# Patient Record
Sex: MALE | Race: WHITE | NOT HISPANIC OR LATINO | Employment: OTHER | URBAN - METROPOLITAN AREA
[De-identification: names, ages, dates, MRNs, and addresses within clinical notes are randomized per-mention and may not be internally consistent; named-entity substitution may affect disease eponyms.]

---

## 2017-01-29 ENCOUNTER — HOSPITAL ENCOUNTER (OUTPATIENT)
Dept: RADIOLOGY | Facility: MEDICAL CENTER | Age: 72
End: 2017-01-29

## 2017-01-29 ENCOUNTER — APPOINTMENT (OUTPATIENT)
Dept: RADIOLOGY | Facility: MEDICAL CENTER | Age: 72
DRG: 149 | End: 2017-01-29
Attending: INTERNAL MEDICINE
Payer: MEDICARE

## 2017-01-29 ENCOUNTER — APPOINTMENT (OUTPATIENT)
Dept: RADIOLOGY | Facility: MEDICAL CENTER | Age: 72
DRG: 149 | End: 2017-01-29
Attending: EMERGENCY MEDICINE
Payer: MEDICARE

## 2017-01-29 ENCOUNTER — HOSPITAL ENCOUNTER (INPATIENT)
Facility: MEDICAL CENTER | Age: 72
LOS: 3 days | DRG: 149 | End: 2017-02-01
Attending: EMERGENCY MEDICINE | Admitting: INTERNAL MEDICINE
Payer: MEDICARE

## 2017-01-29 ENCOUNTER — RESOLUTE PROFESSIONAL BILLING HOSPITAL PROF FEE (OUTPATIENT)
Dept: HOSPITALIST | Facility: MEDICAL CENTER | Age: 72
End: 2017-01-29
Payer: MEDICARE

## 2017-01-29 DIAGNOSIS — R27.0 ATAXIA: ICD-10-CM

## 2017-01-29 DIAGNOSIS — I10 ESSENTIAL HYPERTENSION: ICD-10-CM

## 2017-01-29 DIAGNOSIS — E03.9 HYPOTHYROIDISM, UNSPECIFIED TYPE: ICD-10-CM

## 2017-01-29 DIAGNOSIS — E11.9 TYPE 2 DIABETES MELLITUS WITHOUT COMPLICATION, UNSPECIFIED LONG TERM INSULIN USE STATUS: ICD-10-CM

## 2017-01-29 PROBLEM — I63.9 STROKE (HCC): Status: ACTIVE | Noted: 2017-01-29

## 2017-01-29 PROBLEM — R29.90 STROKE-LIKE SYMPTOMS: Status: ACTIVE | Noted: 2017-01-29

## 2017-01-29 LAB
ALBUMIN SERPL BCP-MCNC: 4 G/DL (ref 3.2–4.9)
ALBUMIN/GLOB SERPL: 1.5 G/DL
ALP SERPL-CCNC: 40 U/L (ref 30–99)
ALT SERPL-CCNC: 17 U/L (ref 2–50)
ANION GAP SERPL CALC-SCNC: 8 MMOL/L (ref 0–11.9)
APTT PPP: 27.7 SEC (ref 24.7–36)
AST SERPL-CCNC: 15 U/L (ref 12–45)
BASOPHILS # BLD AUTO: 1.1 % (ref 0–1.8)
BASOPHILS # BLD: 0.05 K/UL (ref 0–0.12)
BILIRUB SERPL-MCNC: 0.6 MG/DL (ref 0.1–1.5)
BUN SERPL-MCNC: 20 MG/DL (ref 8–22)
CALCIUM SERPL-MCNC: 8.8 MG/DL (ref 8.5–10.5)
CHLORIDE SERPL-SCNC: 107 MMOL/L (ref 96–112)
CO2 SERPL-SCNC: 25 MMOL/L (ref 20–33)
CREAT SERPL-MCNC: 1.4 MG/DL (ref 0.5–1.4)
EOSINOPHIL # BLD AUTO: 0.1 K/UL (ref 0–0.51)
EOSINOPHIL NFR BLD: 2.2 % (ref 0–6.9)
ERYTHROCYTE [DISTWIDTH] IN BLOOD BY AUTOMATED COUNT: 45.4 FL (ref 35.9–50)
EST. AVERAGE GLUCOSE BLD GHB EST-MCNC: 134 MG/DL
GFR SERPL CREATININE-BSD FRML MDRD: 50 ML/MIN/1.73 M 2
GLOBULIN SER CALC-MCNC: 2.6 G/DL (ref 1.9–3.5)
GLUCOSE BLD-MCNC: 106 MG/DL (ref 65–99)
GLUCOSE BLD-MCNC: 167 MG/DL (ref 65–99)
GLUCOSE BLD-MCNC: 97 MG/DL (ref 65–99)
GLUCOSE SERPL-MCNC: 141 MG/DL (ref 65–99)
HBA1C MFR BLD: 6.3 % (ref 0–5.6)
HCT VFR BLD AUTO: 51.7 % (ref 42–52)
HGB BLD-MCNC: 17.3 G/DL (ref 14–18)
IMM GRANULOCYTES # BLD AUTO: 0.01 K/UL (ref 0–0.11)
IMM GRANULOCYTES NFR BLD AUTO: 0.2 % (ref 0–0.9)
INR PPP: 0.93 (ref 0.87–1.13)
LYMPHOCYTES # BLD AUTO: 0.92 K/UL (ref 1–4.8)
LYMPHOCYTES NFR BLD: 20.2 % (ref 22–41)
MCH RBC QN AUTO: 29.8 PG (ref 27–33)
MCHC RBC AUTO-ENTMCNC: 33.5 G/DL (ref 33.7–35.3)
MCV RBC AUTO: 89.1 FL (ref 81.4–97.8)
MONOCYTES # BLD AUTO: 0.29 K/UL (ref 0–0.85)
MONOCYTES NFR BLD AUTO: 6.4 % (ref 0–13.4)
NEUTROPHILS # BLD AUTO: 3.18 K/UL (ref 1.82–7.42)
NEUTROPHILS NFR BLD: 69.9 % (ref 44–72)
NRBC # BLD AUTO: 0 K/UL
NRBC BLD AUTO-RTO: 0 /100 WBC
PLATELET # BLD AUTO: 125 K/UL (ref 164–446)
PMV BLD AUTO: 10.1 FL (ref 9–12.9)
POTASSIUM SERPL-SCNC: 4.3 MMOL/L (ref 3.6–5.5)
PROT SERPL-MCNC: 6.6 G/DL (ref 6–8.2)
PROTHROMBIN TIME: 12.7 SEC (ref 12–14.6)
RBC # BLD AUTO: 5.8 M/UL (ref 4.7–6.1)
SODIUM SERPL-SCNC: 140 MMOL/L (ref 135–145)
TROPONIN I SERPL-MCNC: <0.01 NG/ML (ref 0–0.04)
WBC # BLD AUTO: 4.6 K/UL (ref 4.8–10.8)

## 2017-01-29 PROCEDURE — 99223 1ST HOSP IP/OBS HIGH 75: CPT | Mod: AI | Performed by: INTERNAL MEDICINE

## 2017-01-29 PROCEDURE — 70547 MR ANGIOGRAPHY NECK W/O DYE: CPT

## 2017-01-29 PROCEDURE — 85025 COMPLETE CBC W/AUTO DIFF WBC: CPT

## 2017-01-29 PROCEDURE — 770020 HCHG ROOM/CARE - TELE (206)

## 2017-01-29 PROCEDURE — 83036 HEMOGLOBIN GLYCOSYLATED A1C: CPT

## 2017-01-29 PROCEDURE — 700102 HCHG RX REV CODE 250 W/ 637 OVERRIDE(OP): Performed by: INTERNAL MEDICINE

## 2017-01-29 PROCEDURE — 85730 THROMBOPLASTIN TIME PARTIAL: CPT

## 2017-01-29 PROCEDURE — 71010 DX-CHEST-PORTABLE (1 VIEW): CPT

## 2017-01-29 PROCEDURE — 94760 N-INVAS EAR/PLS OXIMETRY 1: CPT

## 2017-01-29 PROCEDURE — 80053 COMPREHEN METABOLIC PANEL: CPT

## 2017-01-29 PROCEDURE — 70544 MR ANGIOGRAPHY HEAD W/O DYE: CPT

## 2017-01-29 PROCEDURE — 99285 EMERGENCY DEPT VISIT HI MDM: CPT

## 2017-01-29 PROCEDURE — 93005 ELECTROCARDIOGRAM TRACING: CPT | Performed by: EMERGENCY MEDICINE

## 2017-01-29 PROCEDURE — 82962 GLUCOSE BLOOD TEST: CPT | Mod: 91

## 2017-01-29 PROCEDURE — 84484 ASSAY OF TROPONIN QUANT: CPT

## 2017-01-29 PROCEDURE — A9270 NON-COVERED ITEM OR SERVICE: HCPCS | Performed by: INTERNAL MEDICINE

## 2017-01-29 PROCEDURE — 700105 HCHG RX REV CODE 258: Performed by: EMERGENCY MEDICINE

## 2017-01-29 PROCEDURE — 85610 PROTHROMBIN TIME: CPT

## 2017-01-29 PROCEDURE — 36415 COLL VENOUS BLD VENIPUNCTURE: CPT

## 2017-01-29 PROCEDURE — 96360 HYDRATION IV INFUSION INIT: CPT

## 2017-01-29 PROCEDURE — 96361 HYDRATE IV INFUSION ADD-ON: CPT

## 2017-01-29 PROCEDURE — 70551 MRI BRAIN STEM W/O DYE: CPT

## 2017-01-29 PROCEDURE — 700105 HCHG RX REV CODE 258: Performed by: INTERNAL MEDICINE

## 2017-01-29 RX ORDER — BISACODYL 10 MG
10 SUPPOSITORY, RECTAL RECTAL
Status: DISCONTINUED | OUTPATIENT
Start: 2017-01-30 | End: 2017-02-01 | Stop reason: HOSPADM

## 2017-01-29 RX ORDER — ASPIRIN 300 MG/1
300 SUPPOSITORY RECTAL DAILY
Status: DISCONTINUED | OUTPATIENT
Start: 2017-01-29 | End: 2017-02-01 | Stop reason: HOSPADM

## 2017-01-29 RX ORDER — DEXTROSE MONOHYDRATE 25 G/50ML
25 INJECTION, SOLUTION INTRAVENOUS
Status: DISCONTINUED | OUTPATIENT
Start: 2017-01-29 | End: 2017-02-01 | Stop reason: HOSPADM

## 2017-01-29 RX ORDER — LEVOTHYROXINE SODIUM 0.05 MG/1
50 TABLET ORAL
COMMUNITY
End: 2017-08-09

## 2017-01-29 RX ORDER — ASPIRIN 81 MG/1
324 TABLET, CHEWABLE ORAL DAILY
Status: DISCONTINUED | OUTPATIENT
Start: 2017-01-29 | End: 2017-02-01 | Stop reason: HOSPADM

## 2017-01-29 RX ORDER — ONDANSETRON 4 MG/1
4 TABLET, ORALLY DISINTEGRATING ORAL EVERY 4 HOURS PRN
Status: DISCONTINUED | OUTPATIENT
Start: 2017-01-29 | End: 2017-02-01 | Stop reason: HOSPADM

## 2017-01-29 RX ORDER — AMOXICILLIN 250 MG
1 CAPSULE ORAL
Status: DISCONTINUED | OUTPATIENT
Start: 2017-01-30 | End: 2017-02-01 | Stop reason: HOSPADM

## 2017-01-29 RX ORDER — HYDRALAZINE HYDROCHLORIDE 20 MG/ML
10 INJECTION INTRAMUSCULAR; INTRAVENOUS
Status: DISCONTINUED | OUTPATIENT
Start: 2017-01-29 | End: 2017-02-01 | Stop reason: HOSPADM

## 2017-01-29 RX ORDER — LACTULOSE 20 G/30ML
30 SOLUTION ORAL
Status: DISCONTINUED | OUTPATIENT
Start: 2017-01-30 | End: 2017-02-01 | Stop reason: HOSPADM

## 2017-01-29 RX ORDER — DOCUSATE SODIUM 100 MG/1
100 CAPSULE, LIQUID FILLED ORAL EVERY MORNING
Status: DISCONTINUED | OUTPATIENT
Start: 2017-01-30 | End: 2017-02-01 | Stop reason: HOSPADM

## 2017-01-29 RX ORDER — SODIUM CHLORIDE 9 MG/ML
INJECTION, SOLUTION INTRAVENOUS CONTINUOUS
Status: DISCONTINUED | OUTPATIENT
Start: 2017-01-29 | End: 2017-01-29

## 2017-01-29 RX ORDER — ASPIRIN 325 MG
325 TABLET ORAL DAILY
Status: DISCONTINUED | OUTPATIENT
Start: 2017-01-29 | End: 2017-02-01 | Stop reason: HOSPADM

## 2017-01-29 RX ORDER — LEVOTHYROXINE SODIUM 0.05 MG/1
50 TABLET ORAL
Status: DISCONTINUED | OUTPATIENT
Start: 2017-01-29 | End: 2017-02-01 | Stop reason: HOSPADM

## 2017-01-29 RX ORDER — ENEMA 19; 7 G/133ML; G/133ML
1 ENEMA RECTAL
Status: DISCONTINUED | OUTPATIENT
Start: 2017-01-30 | End: 2017-02-01 | Stop reason: HOSPADM

## 2017-01-29 RX ORDER — ZOLPIDEM TARTRATE 5 MG/1
10 TABLET ORAL
Status: DISCONTINUED | OUTPATIENT
Start: 2017-01-29 | End: 2017-02-01 | Stop reason: HOSPADM

## 2017-01-29 RX ORDER — ALPRAZOLAM 0.5 MG/1
1 TABLET ORAL
Status: DISCONTINUED | OUTPATIENT
Start: 2017-01-29 | End: 2017-02-01 | Stop reason: HOSPADM

## 2017-01-29 RX ORDER — ATENOLOL 25 MG/1
25 TABLET ORAL DAILY
COMMUNITY

## 2017-01-29 RX ORDER — AMOXICILLIN 250 MG
1 CAPSULE ORAL NIGHTLY
Status: DISCONTINUED | OUTPATIENT
Start: 2017-01-30 | End: 2017-02-01 | Stop reason: HOSPADM

## 2017-01-29 RX ORDER — AMLODIPINE BESYLATE 10 MG/1
10 TABLET ORAL DAILY
COMMUNITY

## 2017-01-29 RX ORDER — ONDANSETRON 2 MG/ML
4 INJECTION INTRAMUSCULAR; INTRAVENOUS EVERY 4 HOURS PRN
Status: DISCONTINUED | OUTPATIENT
Start: 2017-01-29 | End: 2017-02-01 | Stop reason: HOSPADM

## 2017-01-29 RX ORDER — ACETAMINOPHEN 325 MG/1
650 TABLET ORAL EVERY 6 HOURS PRN
Status: DISCONTINUED | OUTPATIENT
Start: 2017-01-29 | End: 2017-02-01 | Stop reason: HOSPADM

## 2017-01-29 RX ORDER — PIOGLITAZONEHYDROCHLORIDE 30 MG/1
30 TABLET ORAL EVERY EVENING
COMMUNITY
End: 2018-09-15

## 2017-01-29 RX ORDER — SODIUM CHLORIDE 9 MG/ML
INJECTION, SOLUTION INTRAVENOUS CONTINUOUS
Status: DISCONTINUED | OUTPATIENT
Start: 2017-01-29 | End: 2017-01-30

## 2017-01-29 RX ORDER — BENAZEPRIL HYDROCHLORIDE 10 MG/1
10 TABLET ORAL DAILY
COMMUNITY

## 2017-01-29 RX ORDER — LABETALOL HYDROCHLORIDE 5 MG/ML
10 INJECTION, SOLUTION INTRAVENOUS EVERY 4 HOURS PRN
Status: DISCONTINUED | OUTPATIENT
Start: 2017-01-29 | End: 2017-02-01 | Stop reason: HOSPADM

## 2017-01-29 RX ADMIN — ALPRAZOLAM 1 MG: 1 TABLET ORAL at 22:18

## 2017-01-29 RX ADMIN — SODIUM CHLORIDE: 9 INJECTION, SOLUTION INTRAVENOUS at 11:01

## 2017-01-29 RX ADMIN — ZOLPIDEM TARTRATE 10 MG: 5 TABLET, FILM COATED ORAL at 22:18

## 2017-01-29 RX ADMIN — SODIUM CHLORIDE: 9 INJECTION, SOLUTION INTRAVENOUS at 18:04

## 2017-01-29 ASSESSMENT — LIFESTYLE VARIABLES
DO YOU DRINK ALCOHOL: NO
DO YOU DRINK ALCOHOL: NO
PACK_YEARS: 1.5 PPD
EVER_SMOKED: YES
ALCOHOL_USE: NO

## 2017-01-29 ASSESSMENT — PAIN SCALES - GENERAL
PAINLEVEL_OUTOF10: 2
PAINLEVEL_OUTOF10: 0
PAINLEVEL_OUTOF10: 0

## 2017-01-29 NOTE — IP AVS SNAPSHOT
2/1/2017          Corbin Schwarz  [address] [facility] [address]    Dear Corbin:    [facility] wants to ensure your discharge home is safe and you or your loved ones have had all your questions answered regarding your care after you leave the hospital.    You may receive a telephone call within two days of your discharge.  This call is to make certain you understand your discharge instructions as well as ensure we provided you with the best care possible during your stay with us.     The call will only last approximately 3-5 minutes and will be done by a nurse.    Once again, we want to ensure your discharge home is safe and that you have a clear understanding of any next steps in your care.  If you have any questions or concerns, please do not hesitate to contact us, we are here for you.  Thank you for choosing [facility] for your healthcare needs.    Sincerely,    Marty Pedro    [facility]

## 2017-01-29 NOTE — ED PROVIDER NOTES
ED Provider Note    CHIEF COMPLAINT  Possible stroke    HPI  oCrbin Schwarz is a 71 y.o. male who presents for evaluation of feeling off balance and numbness on the right side of his face.  The patient states that at 9:30 last night he got up to walk and noticed that he was falling to the right side.  He then developed some numbness in the right side of his face.  The patient was seen at [facility] in [place], [place].  CT scanning of the brain did not show any acute abnormalities.  They were concerned about the possibility of stroke syndrome.  The patient was transferred here for evaluation.  The patient denies: Headache, visual changes, unilateral motor weakness or paresthesias, chest pain, shortness breath, gastrointestinal symptoms.  No other acute symptomatology or complaints.    REVIEW OF SYSTEMS  See HPI for further details. All other systems negative.    PAST MEDICAL HISTORY  Past Medical History   Diagnosis Date   • Diabetes      oral medication   • Hypertension    • Hiatus hernia syndrome    • Bronchitis 2001   • CATARACT      removed   • Pain 1/16/12     3/10 lower back   • Unspecified disorder of thyroid        FAMILY HISTORY  No family history on file.    SOCIAL HISTORY  Past history tobacco use; denies alcohol or drugs;    SURGICAL HISTORY  Past Surgical History   Procedure Laterality Date   • Other  2008     right shoulder    • Lumbar laminectomy diskectomy  2/9/2012     Performed by GRETTA THORPE at SURGERY [facility] ORS       CURRENT MEDICATIONS  See nurses notes    ALLERGIES  Allergies   Allergen Reactions   • Codeine Itching   • Hydrocodone Itching     insomnia   • Oxycodone Itching     insomnia   • Cymbalta [Duloxetine Hcl]      irritable   • Statins [Hmg-Coa-R Inhibitors]      irritable   • Tramadol      insomnia       PHYSICAL EXAM  VITAL SIGNS: see nurse's notes  Constitutional: 71-year-old male, pleasant, appears mildly weak, oriented ×3  HENT: ,Atraumatic, Bilateral  external ears normal, tympanic membranes clear, Oropharynx moist, No oral exudates, Nose normal.   Eyes: PERRL, EOMI, Conjunctiva normal, No discharge.   Neck: Normal range of motion, No tenderness, Supple, No stridor.   Lymphatic: No lymphadenopathy noted.   Cardiovascular: Normal heart rate, Normal rhythm, No murmurs, No rubs, No gallops.   Thorax & Lungs: Normal Equal breath sounds, No respiratory distress, No wheezing, no stridor, no rales. No chest tenderness.   Abdomen: Soft, nontender, nondistended, no organomegaly, positive bowel sounds normal in quality. No guarding or rebound.  Skin: Good skin turgor, pink, warm, dry. No rashes, petechiae, purpura. Normal capillary refill.   Back: No tenderness, No CVA tenderness.   Extremities: Intact distal pulses, No edema, No tenderness, No cyanosis, No clubbing. Vascular: Pulses are 2+, symmetric in the upper and lower extremities.  Musculoskeletal: Diffuse arthritic changes; No tenderness to palpation or major deformities noted.   Neurologic: Alert & oriented x 3, Normal motor function, Normal sensory function, No gross focal deficits noted.  Mild ataxia; subjective tingling to the right side of face;  Psychiatric: Affect normal, Judgment normal, Mood normal.         EKG  I have interpreted: Rate 65, rhythm sinus, left axis deviation, NJ QRS Q-T intervals normal, no acute ischemic or injury changes, 12-lead EKG, no old tracing for comparison;    RADIOLOGY/PROCEDURES  CT of the head from the [facility] showed: No intracranial hemorrhage, mass or obvious cerebrovascular accident is appreciated.  No changes seen from prior study;    DX-CHEST-PORTABLE (1 VIEW)   Final Result      1.  There is no acute cardiopulmonary process.      OUTSIDE IMAGES-CT HEAD   Preliminary Result            COURSE & MEDICAL DECISION MAKING  Pertinent Labs & Imaging studies reviewed. (See chart for details)  1.  Monitor  2.  Saline lock    Laboratory studies from the [facility]:  CBC shows white count 4.1, 65% neutrophils, 22% lymphocytes, 6% monocytes, hemoglobin 17.6 hematocrit 52.4; CMP shows is within normal limits;      Laboratory studies: CBC shows white count 4.6, 69% neutrophils, 20% leukocytes, 6% monocytes, hemoglobin 17.3, hematocrit 51.7; CMP shows a glucose 141 otherwise within normal limits; coags within normal limits; troponin less than 0.01;    Discussion/consultations: At this time, the patient presents for evaluation of possible stroke.  The patient does have some mild ataxia.  The patient does not meet criteria for thrombolytic therapy because his symptoms started over 12 hours ago in the patient's symptoms are improving.  The patient may have sustained a vertebrobasilar stroke.  Patient remained neurologically stable.  I spoke with the hospitalist on-call.  The patient will be admitted for further monitoring, treatment, and care.    FINAL IMPRESSION  1. Ataxia    2. Essential hypertension    3. Type 2 diabetes mellitus without complication, unspecified long term insulin use status (HCC)    4. Hypothyroidism, unspecified type        PLAN  1.  The patient will be admitted for further monitoring, treatment, and care    Electronically signed by: Guy G Gansert, 1/29/2017 10:14 AM

## 2017-01-29 NOTE — H&P
ATTENDING:  [facility] hospitalist.    PRIMARY CARE PHYSICIAN:  ABDI Bravo    CODE STATUS:  Full code.    CHIEF COMPLAINT:  Listing to the right and right facial tingling.    HISTORY OF PRESENT ILLNESS:  This is a 71-year-old male who presented to the   emergency department from [facility] due to stroke-like symptoms.  Patient   states at 9:30 p.m. last night, he noted that he had trouble ambulating.  He   was always, kind of going toward the right.  Patient states he did not feel   any significant weakness, just kept going to the right.  Patient also   complained of some right facial tingling which is improving.  Patient is   unsure if ambulation has improved, because he has not been on a ____ as he has   been in healthcare facilities.  Patient was outside of a timeframe for TPA,   so none was given.  Patient states prior to 9:30, he was in his usual state of   health and had no complaints.  Patient states this has never happened to him   before.    PAST MEDICAL HISTORY:   1.  Diabetes.   2.  Hypertension.   3.  Hypothyroidism.     PAST SURGICAL HISTORY:  1.  Right shoulder surgery.   2.  Lumbar laminectomy.   3.  Appendectomy.   4.  Tonsillectomy.   5.  Right knee surgery.     MEDICATIONS:  1.  Xanax 1 mg at bedtime.   2.  Amlodipine 10 mg daily.   3.  Atenolol 25 mg daily.   4.  Vitamin B complex b.i.d.   5.  Benazepril 10 mg daily.   6.  Carey-Comfort p.r.n.   7.  Cholecalciferol 2000 units daily.   8.  Glipizide 5 mg b.i.d.   9.  Krill oil 1000 mg daily.   10.  Synthroid 50 mcg daily.   11.  Actos 30 mg daily.   12.  Ambien 10 mg daily.     ALLERGIES:  CODEINE, HYDROCODONE, OXYCODONE, CYMBALTA, STATINS, TRAMADOL.    SOCIAL HISTORY:  He quit smoking 9 years ago, he still uses chewing tobacco.    Denies any alcohol or illicit drug use.    FAMILY HISTORY:  Patient denies any knowledge of medical problems in the   family.    REVIEW OF SYSTEMS:  Complete review of systems obtained with positives noted   in  the HPI above, all others negative.    PHYSICAL EXAMINATION:  VITAL SIGNS:  Temperature 36.4, pulse 70, respirations 18, blood pressure   127/86 and satting 95% on room air.  GENERAL:  No acute distress, alert and oriented x3.  HEENT:  Normocephalic, atraumatic, moist mucous membranes.  NECK:  No bruit, thyromegaly, cervical or supraclavicular adenopathy noted.  CARDIOVASCULAR:  Regular rate and rhythm, 2/6 systolic murmur, no rubs or   gallops.  LUNGS:  Clear to auscultation bilaterally.  No crackles, rhonchi or wheezes.  ABDOMEN:  Bowel sounds x4, soft, nontender, nondistended, no   hepatosplenomegaly.  EXTREMITIES:  No clubbing, cyanosis or edema.  SKIN:  No rash or erythema.  NEUROLOGIC:  Decreased sensation subjectively on the right side of his face,   strength is 5/5 bilateral upper and lower extremity.    LABORATORY DATA:  White count 4.6, hemoglobin 17.3, hematocrit 51.7 and   platelets 125.  Sodium 140, potassium 4.3, chloride 107, CO2 is 25, BUN 20,   creatinine 1.4 and glucose is 141.      IMAGING DATA:  Chest x-ray showed no acute cardiopulmonary process.    ASSESSMENT AND PLAN:  1.  Stroke -- patient has persistent symptoms of a right-sided facial droop,   unsure if he still has listing to the right that he had prior.  We will obtain   an MRI of the brain as well as an MRA of the head and neck.  Patient will be   given a STATIN, even though he has an allergy to this.  The allergy is just   dizziness.  We will also obtain physical and occupational therapy.  Patient   will be given a diet if he passes a bedside swallow.  In the meantime, we will   allow permissive hypertension and closely monitor his blood pressure.    Patient will require treatment for at least 2 days in the hospital.   2.  Hypertension -- permissive hypertension as above, await for an MRI of the   brain.   3.  Diabetes -- we will place patient on insulin sliding scale, adjust as   necessary.   4.  Hypothyroidism -- continue  Synthroid.       ____________________________________     DO MARY LOU MUNROE    DD:  01/29/2017 13:24:12  DT:  01/29/2017 15:15:53    D#:  [id number]  Job#:  [id number]

## 2017-01-29 NOTE — ED NOTES
BIB EMS from [facility] with   Chief Complaint   Patient presents with   • Possible Stroke   • Dizziness     dizziness since 2130 last night night, unable to ambulate   • Numbness     right face from eye to lip   NIH score is a 1. States he is unable to ambulate. Hx of DM Type 2, HTN, and hypercholesterolemia.

## 2017-01-29 NOTE — IP AVS SNAPSHOT
" <p align=\"LEFT\"><IMG SRC=\"//[email or web address]/blob$/[email or web address]\" alt=\"Image\" WIDTH=\"50%\" HEIGHT=\"200\" BORDER=\"\"></p>                   Name:Corbin Schwarz  Medical Record Number:[id number]  CSN: [id number]    YOB: 1945   Age: 71 y.o.  Sex: male  HT:1.93 m (6' 3.98\") WT: 131.7 kg (290 lb 5.5 oz)          Admit Date: 1/29/2017     Discharge Date:   Today's Date: 2/1/2017  Attending Doctor:  Blaine Dumas M.D.                  Allergies:  Codeine; Hydrocodone; Oxycodone; Cymbalta; Statins; and Tramadol          Your appointments     May 12, 2017  1:00 PM   FOLLOW UP with Jeremias Gallo M.D.   [facility] (--)    [address] [place] [id number] [phone]              Follow-up Information     1. Follow up with AFSANEH Hunter. Schedule an appointment as soon as possible for a visit in 2 weeks.    Specialty:  Family Medicine    Why:  Follow-up appointment    Contact information    [address] [facility] [address]  [phone]           Medication List      Take these Medications        Instructions    VIK-SELTZER PLUS COLD PO    Take 2 Tabs by mouth every bedtime.   Dose:  2 Tab       alprazolam 1 MG Tabs   Commonly known as:  XANAX    Take 1 mg by mouth every bedtime.   Dose:  1 mg       AMBIEN 10 MG Tabs   Generic drug:  zolpidem    Take 10 mg by mouth every bedtime. Indications: Trouble Sleeping   Dose:  10 mg       amlodipine 10 MG Tabs   Commonly known as:  NORVASC    Take 10 mg by mouth every day.   Dose:  10 mg       atenolol 25 MG Tabs   Commonly known as:  TENORMIN    Take 25 mg by mouth every day.   Dose:  25 mg       B COMPLEX PO    Take 1 Tab by mouth 2 Times a Day.   Dose:  1 Tab       benazepril 10 MG Tabs   Commonly known as:  LOTENSIN    Take 10 mg by mouth every day.   Dose:  10 mg       glipiZIDE 5 MG Tabs   Commonly known as:  GLUCOTROL    Take 5 mg by mouth 2 times a day.   Dose:  5 mg       Krill Oil 1000 MG Caps   " Take 1,000 mg by mouth every day.   Dose:  1000 mg       levothyroxine 50 MCG Tabs   Commonly known as:  SYNTHROID    Take 50 mcg by mouth Every morning on an empty stomach.   Dose:  50 mcg       meclizine 25 MG Tabs   Commonly known as:  ANTIVERT    Take 1 Tab by mouth 3 times a day as needed for Vertigo.   Dose:  25 mg       pioglitazone 30 MG Tabs   Commonly known as:  ACTOS    Take 30 mg by mouth every evening.   Dose:  30 mg       Vitamin D3 2000 UNIT Caps    Take 2,000 Units by mouth every day.   Dose:  2000 Units

## 2017-01-29 NOTE — IP AVS SNAPSHOT
" After Visit Summary                                                                                                                  Name:Corbin Schwarz  Medical Record Number:[id number]  CSN: [id number]    YOB: 1945   Age: 71 y.o.  Sex: male  HT:1.93 m (6' 3.98\") WT: 131.7 kg (290 lb 5.5 oz)          Admit Date: 1/29/2017     Discharge Date:   Today's Date: 2/1/2017  Attending Doctor:  Blaine Dumas M.D.                  Allergies:  Codeine; Hydrocodone; Oxycodone; Cymbalta; Statins; and Tramadol            Discharge Instructions       Discharge Instructions    Discharged to home by car with relative. Discharged via wheelchair, hospital escort: Yes.  Special equipment needed: Not Applicable    Be sure to schedule a follow-up appointment with your primary care doctor or any specialists as instructed.     Discharge Plan:   Diet Plan: Discussed  Activity Level: Discussed  Confirmed Follow up Appointment: Patient to Call and Schedule Appointment  Confirmed Symptoms Management: Discussed  Medication Reconciliation Updated: Yes  Influenza Vaccine Indication: Not indicated: Previously immunized this influenza season and > 8 years of age    I understand that a diet low in cholesterol, fat, and sodium is recommended for good health. Unless I have been given specific instructions below for another diet, I accept this instruction as my diet prescription.   Other diet: Diabetic      Special Instructions: None    · Is patient discharged on Warfarin / Coumadin?   No     · Is patient Post Blood Transfusion?  No    Depression / Suicide Risk    As [facility], it is important to learn how to keep safe from harming yourself.    Recognize the warning signs:  · Abrupt changes in personality, positive or negative- including increase in energy   · Giving away possessions  · Change in eating patterns- significant weight changes-  positive or negative  · Change in sleeping " patterns- unable to sleep or sleeping all the time   · Unwillingness or inability to communicate  · Depression  · Unusual sadness, discouragement and loneliness  · Talk of wanting to die  · Neglect of personal appearance   · Rebelliousness- reckless behavior  · Withdrawal from people/activities they love  · Confusion- inability to concentrate     If you or a loved one observes any of these behaviors or has concerns about self-harm, here's what you can do:  · Talk about it- your feelings and reasons for harming yourself  · Remove any means that you might use to hurt yourself (examples: pills, rope, extension cords, firearm)  · Get professional help from the community (Mental Health, Substance Abuse, psychological counseling)  · Do not be alone:Call your Safe Contact- someone whom you trust who will be there for you.  · Call your local CRISIS HOTLINE 837-0579 or 626-557-2526  · Call your local Children's Mobile Crisis Response Team [place] (258) 941-7470 or www.[employer]  · Call the toll free National Suicide Prevention Hotlines   · National Suicide Prevention Lifeline 194-749-LNVF (1302)  · National Hope Line Network 800-SUICIDE (047-2912)        Your appointments     May 12, 2017  1:00 PM   FOLLOW UP with Jeremias Gallo M.D.   [facility] (--)    [address] [place] [id number] [phone]              Follow-up Information     1. Follow up with Melany Smith F.N.PAbel. Schedule an appointment as soon as possible for a visit in 2 weeks.    Specialty:  Family Medicine    Why:  Follow-up appointment    Contact information    [address] [facility] [address] [phone]           Discharge Medication Instructions:    Below are the medications your physician expects you to take upon discharge:    Review all your home medications and newly ordered medications with your doctor and/or pharmacist. Follow medication instructions as directed by your  doctor and/or pharmacist.    Please keep your medication list with you and share with your physician.               Medication List      START taking these medications        Instructions    meclizine 25 MG Tabs   Last time this was given:  25 mg on 2/1/2017  8:45 AM   Commonly known as:  ANTIVERT    Take 1 Tab by mouth 3 times a day as needed for Vertigo.   Dose:  25 mg         CONTINUE taking these medications        Instructions    VIK-SELTZER PLUS COLD PO    Take 2 Tabs by mouth every bedtime.   Dose:  2 Tab       alprazolam 1 MG Tabs   Last time this was given:  1 mg on 1/31/2017  9:28 PM   Commonly known as:  XANAX    Take 1 mg by mouth every bedtime.   Dose:  1 mg       AMBIEN 10 MG Tabs   Last time this was given:  10 mg on 1/31/2017  9:28 PM   Generic drug:  zolpidem    Take 10 mg by mouth every bedtime. Indications: Trouble Sleeping   Dose:  10 mg       amlodipine 10 MG Tabs   Commonly known as:  NORVASC    Take 10 mg by mouth every day.   Dose:  10 mg       atenolol 25 MG Tabs   Commonly known as:  TENORMIN    Take 25 mg by mouth every day.   Dose:  25 mg       B COMPLEX PO    Take 1 Tab by mouth 2 Times a Day.   Dose:  1 Tab       benazepril 10 MG Tabs   Commonly known as:  LOTENSIN    Take 10 mg by mouth every day.   Dose:  10 mg       glipiZIDE 5 MG Tabs   Commonly known as:  GLUCOTROL    Take 5 mg by mouth 2 times a day.   Dose:  5 mg       Krill Oil 1000 MG Caps    Take 1,000 mg by mouth every day.   Dose:  1000 mg       levothyroxine 50 MCG Tabs   Last time this was given:  50 mcg on 2/1/2017  5:27 AM   Commonly known as:  SYNTHROID    Take 50 mcg by mouth Every morning on an empty stomach.   Dose:  50 mcg       pioglitazone 30 MG Tabs   Commonly known as:  ACTOS    Take 30 mg by mouth every evening.   Dose:  30 mg       Vitamin D3 2000 UNIT Caps    Take 2,000 Units by mouth every day.   Dose:  2000 Units               Instructions           Diet / Nutrition:    Follow any diet instructions given  to you by your doctor or the dietician, including how much salt (sodium) you are allowed each day.    If you are overweight, talk to your doctor about a weight reduction plan.    Activity:    Remain physically active following your doctor's instructions about exercise and activity.    Rest often.     Any time you become even a little tired or short of breath, SIT DOWN and rest.    Worsening Symptoms:    Report any of the following signs and symptoms to the doctor's office immediately:    *Pain of jaw, arm, or neck  *Chest pain not relieved by medication                               *Dizziness or loss of consciousness  *Difficulty breathing even when at rest   *More tired than usual                                       *Bleeding drainage or swelling of surgical site  *Swelling of feet, ankles, legs or stomach                 *Fever (>100ºF)  *Pink or blood tinged sputum  *Weight gain (3lbs/day or 5lbs /week)           *Shock from internal defibrillator (if applicable)  *Palpitations or irregular heartbeats                *Cool and/or numb extremities    Stroke Awareness    Common Risk Factors for Stroke include:    Age  Atrial Fibrillation  Carotid Artery Stenosis  Diabetes Mellitus  Excessive alcohol consumption  High blood pressure  Overweight   Physical inactivity  Smoking    Warning signs and symptoms of a stroke include:    *Sudden numbness or weakness of the face, arm or leg (especially on one side of the body).  *Sudden confusion, trouble speaking or understanding.  *Sudden trouble seeing in one or both eyes.  *Sudden trouble walking, dizziness, loss of balance or coordination.Sudden severe headache with no known cause.    It is very important to get treatment quickly when a stroke occurs. If you experience any of the above warning signs, call 911 immediately.                   Disclaimer         Quit Smoking / Tobacco Use:    I understand the use of any tobacco products increases my chance of suffering from  future heart disease or stroke and could cause other illnesses which may shorten my life. Quitting the use of tobacco products is the single most important thing I can do to improve my health. For further information on smoking / tobacco cessation call a Toll Free Quit Line at 1-795.611.6874 (*National Cancer [place]) or 1-948.696.6953 (American Lung Association) or you can access the web based program at www.lungusa.org.    [place] Tobacco Users Help Line:  (149) 291-5437       Toll Free: 1-112.775.7662  Quit Tobacco Program [facility] Management Services (592)863-1561    Crisis Hotline:    [place] Crisis Hotline:  1-666-KWEIAJQ or 1-484.628.3741    [place] Crisis Hotline:    1-519.502.3401 or 195-739-0591    Discharge Survey:   Thank you for choosing [facility]. We hope we did everything we could to make your hospital stay a pleasant one. You may be receiving a phone survey and we would appreciate your time and participation in answering the questions. Your input is very valuable to us in our efforts to improve our service to our patients and their families.        My signature on this form indicates that:    1. I have reviewed and understand the above information.  2. My questions regarding this information have been answered to my satisfaction.  3. I have formulated a plan with my discharge nurse to obtain my prescribed medications for home.                  Disclaimer         __________________________________                     __________       ________                       Patient Signature                                                 Date                    Time

## 2017-01-29 NOTE — ED NOTES
"Med rec updated and complete  Allergies reviewed  Pt states \"No antibiotics in the last 30 days\".  Pt states \"He does take VIK-SELTZERT PLUS 2 tablets at bedtime, NOT PRN\".    "

## 2017-01-30 ENCOUNTER — PATIENT OUTREACH (OUTPATIENT)
Dept: HEALTH INFORMATION MANAGEMENT | Facility: OTHER | Age: 72
End: 2017-01-30

## 2017-01-30 PROBLEM — R29.898 LOWER EXTREMITY WEAKNESS: Status: ACTIVE | Noted: 2017-01-30

## 2017-01-30 PROBLEM — N28.9 RENAL INSUFFICIENCY: Status: ACTIVE | Noted: 2017-01-30

## 2017-01-30 PROBLEM — E78.5 HYPERLIPIDEMIA: Status: ACTIVE | Noted: 2017-01-30

## 2017-01-30 PROBLEM — I77.1 SUBCLAVIAN ARTERY STENOSIS (HCC): Status: ACTIVE | Noted: 2017-01-30

## 2017-01-30 PROBLEM — M54.9 CHRONIC BACK PAIN: Status: ACTIVE | Noted: 2017-01-30

## 2017-01-30 PROBLEM — E11.9 DM2 (DIABETES MELLITUS, TYPE 2) (HCC): Status: ACTIVE | Noted: 2017-01-30

## 2017-01-30 PROBLEM — G89.29 CHRONIC BACK PAIN: Status: ACTIVE | Noted: 2017-01-30

## 2017-01-30 LAB
ANION GAP SERPL CALC-SCNC: 9 MMOL/L (ref 0–11.9)
BUN SERPL-MCNC: 13 MG/DL (ref 8–22)
CALCIUM SERPL-MCNC: 8.9 MG/DL (ref 8.5–10.5)
CHLORIDE SERPL-SCNC: 108 MMOL/L (ref 96–112)
CHOLEST SERPL-MCNC: 223 MG/DL (ref 100–199)
CO2 SERPL-SCNC: 21 MMOL/L (ref 20–33)
CREAT SERPL-MCNC: 1.13 MG/DL (ref 0.5–1.4)
ERYTHROCYTE [DISTWIDTH] IN BLOOD BY AUTOMATED COUNT: 44.5 FL (ref 35.9–50)
GFR SERPL CREATININE-BSD FRML MDRD: >60 ML/MIN/1.73 M 2
GLUCOSE BLD-MCNC: 121 MG/DL (ref 65–99)
GLUCOSE BLD-MCNC: 135 MG/DL (ref 65–99)
GLUCOSE BLD-MCNC: 143 MG/DL (ref 65–99)
GLUCOSE BLD-MCNC: 143 MG/DL (ref 65–99)
GLUCOSE SERPL-MCNC: 107 MG/DL (ref 65–99)
HCT VFR BLD AUTO: 51.4 % (ref 42–52)
HDLC SERPL-MCNC: 37 MG/DL
HGB BLD-MCNC: 16.8 G/DL (ref 14–18)
LDLC SERPL CALC-MCNC: 146 MG/DL
MCH RBC QN AUTO: 28.9 PG (ref 27–33)
MCHC RBC AUTO-ENTMCNC: 32.7 G/DL (ref 33.7–35.3)
MCV RBC AUTO: 88.5 FL (ref 81.4–97.8)
PLATELET # BLD AUTO: 135 K/UL (ref 164–446)
PMV BLD AUTO: 10.5 FL (ref 9–12.9)
POTASSIUM SERPL-SCNC: 3.9 MMOL/L (ref 3.6–5.5)
RBC # BLD AUTO: 5.81 M/UL (ref 4.7–6.1)
SODIUM SERPL-SCNC: 138 MMOL/L (ref 135–145)
TRIGL SERPL-MCNC: 202 MG/DL (ref 0–149)
WBC # BLD AUTO: 4.9 K/UL (ref 4.8–10.8)

## 2017-01-30 PROCEDURE — 80061 LIPID PANEL: CPT

## 2017-01-30 PROCEDURE — 99233 SBSQ HOSP IP/OBS HIGH 50: CPT | Performed by: FAMILY MEDICINE

## 2017-01-30 PROCEDURE — 700112 HCHG RX REV CODE 229: Performed by: INTERNAL MEDICINE

## 2017-01-30 PROCEDURE — G8980 MOBILITY D/C STATUS: HCPCS | Mod: CI

## 2017-01-30 PROCEDURE — A9270 NON-COVERED ITEM OR SERVICE: HCPCS | Performed by: INTERNAL MEDICINE

## 2017-01-30 PROCEDURE — 97165 OT EVAL LOW COMPLEX 30 MIN: CPT

## 2017-01-30 PROCEDURE — G8979 MOBILITY GOAL STATUS: HCPCS | Mod: CI

## 2017-01-30 PROCEDURE — G8987 SELF CARE CURRENT STATUS: HCPCS | Mod: CI

## 2017-01-30 PROCEDURE — 97161 PT EVAL LOW COMPLEX 20 MIN: CPT

## 2017-01-30 PROCEDURE — G8989 SELF CARE D/C STATUS: HCPCS | Mod: CI

## 2017-01-30 PROCEDURE — G8978 MOBILITY CURRENT STATUS: HCPCS | Mod: CI

## 2017-01-30 PROCEDURE — 85027 COMPLETE CBC AUTOMATED: CPT

## 2017-01-30 PROCEDURE — 36415 COLL VENOUS BLD VENIPUNCTURE: CPT

## 2017-01-30 PROCEDURE — 80048 BASIC METABOLIC PNL TOTAL CA: CPT

## 2017-01-30 PROCEDURE — 700102 HCHG RX REV CODE 250 W/ 637 OVERRIDE(OP): Performed by: INTERNAL MEDICINE

## 2017-01-30 PROCEDURE — 770020 HCHG ROOM/CARE - TELE (206)

## 2017-01-30 PROCEDURE — 82962 GLUCOSE BLOOD TEST: CPT

## 2017-01-30 PROCEDURE — 93880 EXTRACRANIAL BILAT STUDY: CPT

## 2017-01-30 PROCEDURE — 700111 HCHG RX REV CODE 636 W/ 250 OVERRIDE (IP): Performed by: FAMILY MEDICINE

## 2017-01-30 PROCEDURE — G8988 SELF CARE GOAL STATUS: HCPCS | Mod: CI

## 2017-01-30 RX ADMIN — ENOXAPARIN SODIUM 40 MG: 100 INJECTION SUBCUTANEOUS at 14:55

## 2017-01-30 RX ADMIN — LEVOTHYROXINE SODIUM 50 MCG: 50 TABLET ORAL at 07:19

## 2017-01-30 RX ADMIN — DOCUSATE SODIUM 100 MG: 100 CAPSULE ORAL at 08:23

## 2017-01-30 RX ADMIN — ZOLPIDEM TARTRATE 10 MG: 5 TABLET, FILM COATED ORAL at 21:54

## 2017-01-30 RX ADMIN — ALPRAZOLAM 1 MG: 1 TABLET ORAL at 21:55

## 2017-01-30 RX ADMIN — STANDARDIZED SENNA CONCENTRATE AND DOCUSATE SODIUM 1 TABLET: 8.6; 5 TABLET, FILM COATED ORAL at 21:54

## 2017-01-30 RX ADMIN — ASPIRIN 325 MG: 325 TABLET, COATED ORAL at 08:23

## 2017-01-30 ASSESSMENT — ENCOUNTER SYMPTOMS
NAUSEA: 0
FOCAL WEAKNESS: 1
SORE THROAT: 0
COUGH: 0
VOMITING: 0
SHORTNESS OF BREATH: 0
BACK PAIN: 1
FEVER: 0
SPEECH CHANGE: 0
SENSORY CHANGE: 0
DIARRHEA: 0
HEARTBURN: 0
HEADACHES: 0
CHILLS: 0
SEIZURES: 0
WHEEZING: 0
ABDOMINAL PAIN: 0
BLURRED VISION: 0

## 2017-01-30 ASSESSMENT — GAIT ASSESSMENTS
GAIT LEVEL OF ASSIST: STAND BY ASSIST
DEVIATION: INCREASED BASE OF SUPPORT
DISTANCE (FEET): 200

## 2017-01-30 ASSESSMENT — ACTIVITIES OF DAILY LIVING (ADL): TOILETING: INDEPENDENT

## 2017-01-30 ASSESSMENT — PAIN SCALES - GENERAL
PAINLEVEL_OUTOF10: 0
PAINLEVEL_OUTOF10: 2
PAINLEVEL_OUTOF10: 2

## 2017-01-30 NOTE — PROGRESS NOTES
Patient arrived to room S151 from ER via [facility]. Patient unable to ambulate, transferred over to bed from [facility]. Patient A&O4. Telemetry box #Neuro 10, Channel E270 applied and monitor room called for verification. Patient sinus, with first degree bundle block, HR 68. Patient skin checked, no pressure ulcers present, but patient has eczema, underneath both arms, right worse than left, along beltline on abdomen. This has been a known condition of the patient for many years. Admission database reviewed with patient. Patient education on the use of call light and to call for assistance, especially when wanting to get up. Bed alarm in place and working. MRI checklist reviewed and faxed to MRI. Plan of care reviewed and all questions answered.

## 2017-01-30 NOTE — THERAPY
"Physical Therapy Evaluation completed.   Bed Mobility:  Supine to Sit: Supervised  Transfers: Sit to Stand: Supervised  Gait: Level Of Assist: Stand by Assist with No Equipment Needed       Plan of Care: Patient with no further skilled PT needs in the acute care setting at this time  Discharge Recommendations: Equipment: No Equipment Needed. Post-acute therapy recommended after discharged home.    See \"Rehab Therapy-Acute\" Patient Summary Report for complete documentation.     "

## 2017-01-30 NOTE — PROGRESS NOTES
Hospital Medicine Progress Note, Adult, Complex               Author: Marcos Arambula Date & Time created: 1/30/2017  11:35 AM     Interval History:  Admitted for Lower extremity weakness, Renal insuffciency    Weakness - mostly legs, going on for some time according to pt., MRI brain equivocal  HTN - BP not controlled  Diabetes - BS controlled  Renal insuff - crea improved    Review of Systems:  Review of Systems   Constitutional: Negative for fever and chills.   HENT: Negative for sore throat.    Eyes: Negative for blurred vision.   Respiratory: Negative for cough, shortness of breath and wheezing.    Cardiovascular: Negative for chest pain and leg swelling.   Gastrointestinal: Negative for heartburn, nausea, vomiting, abdominal pain and diarrhea.   Genitourinary: Negative for dysuria.   Musculoskeletal: Positive for back pain.   Neurological: Positive for focal weakness. Negative for sensory change, speech change, seizures and headaches.       Physical Exam:  Physical Exam   Constitutional: He is oriented to person, place, and time. He appears well-developed and well-nourished.   HENT:   Head: Normocephalic and atraumatic.   Eyes: Conjunctivae are normal. Pupils are equal, round, and reactive to light.   Neck: No tracheal deviation present. No thyromegaly present.   Cardiovascular: Normal rate and regular rhythm.    Pulmonary/Chest: Effort normal and breath sounds normal.   Abdominal: Soft. Bowel sounds are normal. He exhibits no distension. There is no tenderness.   Lymphadenopathy:     He has no cervical adenopathy.   Neurological: He is alert and oriented to person, place, and time. No cranial nerve deficit.   MMT BUE 5/5, BLE 4+/5   Skin: Skin is warm and dry.   Nursing note and vitals reviewed.      Labs:        Invalid input(s): [id number]  Recent Labs      01/29/17   1038   TROPONINI  <0.01     Recent Labs      01/29/17   1038  01/30/17   0157   SODIUM  140  138   POTASSIUM  4.3  3.9   CHLORIDE  107   108   CO2  25  21   BUN  20  13   CREATININE  1.40  1.13   CALCIUM  8.8  8.9     Recent Labs      17   1038  17   0157   ALTSGPT  17   --    ASTSGOT  15   --    ALKPHOSPHAT  40   --    TBILIRUBIN  0.6   --    GLUCOSE  141*  107*     Recent Labs      17   1038  17   0158   RBC  5.80  5.81   HEMOGLOBIN  17.3  16.8   HEMATOCRIT  51.7  51.4   PLATELETCT  125*  135*   PROTHROMBTM  12.7   --    APTT  27.7   --    INR  0.93   --      Recent Labs      17   1038  17   0158   WBC  4.6*  4.9   NEUTSPOLYS  69.90   --    LYMPHOCYTES  20.20*   --    MONOCYTES  6.40   --    EOSINOPHILS  2.20   --    BASOPHILS  1.10   --    ASTSGOT  15   --    ALTSGPT  17   --    ALKPHOSPHAT  40   --    TBILIRUBIN  0.6   --            Hemodynamics:  Temp (24hrs), Av.3 °C (97.3 °F), Min:35.8 °C (96.4 °F), Max:36.5 °C (97.7 °F)  Temperature: 36.5 °C (97.7 °F)  Pulse  Av.6  Min: 58  Max: 87Heart Rate (Monitored): 64  Blood Pressure : (!) 169/95 mmHg (notified RN), NIBP: 141/47 mmHg     Respiratory:    Respiration: 18, Pulse Oximetry: 93 %           Fluids:    Intake/Output Summary (Last 24 hours) at 17 1135  Last data filed at 17 1600   Gross per 24 hour   Intake      0 ml   Output    400 ml   Net   -400 ml     Weight: (!) 131.7 kg (290 lb 5.5 oz)  GI/Nutrition:  Orders Placed This Encounter   Procedures   • DIET ORDER     Standing Status: Standing      Number of Occurrences: 1      Standing Expiration Date:      Order Specific Question:  Diet:     Answer:  Diabetic [3]     Medical Decision Making, by Problem:  Active Hospital Problems    Diagnosis   • *Lower extremity weakness [M62.81]           ASA, check MRI L spine, PT/OT, check vit b12   • Hyperlipidemia [E78.5]         should be on statin   • DM2 (diabetes mellitus, type 2) (CMS-HCC) [E11.9]     SSI   • Renal insufficiency [N28.9]      IVF NS   • Subclavian artery stenosis (CMS-HCC) [I77.1]       check carotid duplex   • Chronic back pain  [M54.9, G89.29]       Pain control   • HTN (hypertension) [I10]     permissive HTN   • Hypothyroidism [E03.9]      Synthroid       Medications reviewed, Labs reviewed, Radiology images reviewed and EKG reviewed  Aden catheter: No Aden      DVT Prophylaxis: Enoxaparin (Lovenox)    Ulcer prophylaxis: No    Assessed for rehab: Patient was assess for and/or received rehabilitation services during this hospitalization

## 2017-01-30 NOTE — PROGRESS NOTES
Phone call out to Dr. Fowler to inform him of patients dysphagia screening and possibly get updated orders.

## 2017-01-30 NOTE — PROGRESS NOTES
Received report from off going nurse. Nurse stated no sig med event during shift. Pt has no c/o pain at this time. Vss. Will continue to monitor.

## 2017-01-30 NOTE — PROGRESS NOTES
Patient educated on bed alarm. Patient refusing bed alarm. Patient educated to use his call light and to call for help. Call light next to patient. Patient verbalizes understanding.

## 2017-01-30 NOTE — THERAPY
"Occupational Therapy Evaluation completed.   Functional Status:  Close Sup. with standing / seated ADLs and functional mobility t/o room. Tendency to furniture cruise with turns and when scanning environment. Reports decreased balance with body turns and head turns for functional tasks.   Plan of Care: Patient with no further skilled OT needs in the acute care setting at this time  Discharge Recommendations:  Equipment: Quad Cane. Post-acute therapy recommended after discharged home.        See \"Rehab Therapy-Acute\" Patient Summary Report for complete documentation.    "

## 2017-01-30 NOTE — PROGRESS NOTES
Monitor Summary: SR 66-80, NH .22, QRS .10, QT .38 with frequent PVC,bigem and rare triplet per strip from monitor room

## 2017-01-31 ENCOUNTER — APPOINTMENT (OUTPATIENT)
Dept: RADIOLOGY | Facility: MEDICAL CENTER | Age: 72
DRG: 149 | End: 2017-01-31
Attending: FAMILY MEDICINE
Payer: MEDICARE

## 2017-01-31 LAB
25(OH)D3 SERPL-MCNC: 37 NG/ML (ref 30–100)
ANION GAP SERPL CALC-SCNC: 12 MMOL/L (ref 0–11.9)
BUN SERPL-MCNC: 17 MG/DL (ref 8–22)
CALCIUM SERPL-MCNC: 9.2 MG/DL (ref 8.5–10.5)
CHLORIDE SERPL-SCNC: 106 MMOL/L (ref 96–112)
CO2 SERPL-SCNC: 21 MMOL/L (ref 20–33)
CREAT SERPL-MCNC: 1.31 MG/DL (ref 0.5–1.4)
GFR SERPL CREATININE-BSD FRML MDRD: 54 ML/MIN/1.73 M 2
GLUCOSE BLD-MCNC: 106 MG/DL (ref 65–99)
GLUCOSE BLD-MCNC: 109 MG/DL (ref 65–99)
GLUCOSE BLD-MCNC: 126 MG/DL (ref 65–99)
GLUCOSE BLD-MCNC: 136 MG/DL (ref 65–99)
GLUCOSE SERPL-MCNC: 121 MG/DL (ref 65–99)
LV EJECT FRACT  99904: 65
LV EJECT FRACT MOD 2C 99903: 71.55
LV EJECT FRACT MOD 4C 99902: 65.36
LV EJECT FRACT MOD BP 99901: 68.86
POTASSIUM SERPL-SCNC: 3.9 MMOL/L (ref 3.6–5.5)
SODIUM SERPL-SCNC: 139 MMOL/L (ref 135–145)
TSH SERPL DL<=0.005 MIU/L-ACNC: 0.63 UIU/ML (ref 0.3–3.7)
VIT B12 SERPL-MCNC: 302 PG/ML (ref 211–911)

## 2017-01-31 PROCEDURE — A9270 NON-COVERED ITEM OR SERVICE: HCPCS | Performed by: HOSPITALIST

## 2017-01-31 PROCEDURE — 99233 SBSQ HOSP IP/OBS HIGH 50: CPT | Performed by: HOSPITALIST

## 2017-01-31 PROCEDURE — 82607 VITAMIN B-12: CPT

## 2017-01-31 PROCEDURE — 700112 HCHG RX REV CODE 229: Performed by: INTERNAL MEDICINE

## 2017-01-31 PROCEDURE — 36415 COLL VENOUS BLD VENIPUNCTURE: CPT

## 2017-01-31 PROCEDURE — A9270 NON-COVERED ITEM OR SERVICE: HCPCS | Performed by: INTERNAL MEDICINE

## 2017-01-31 PROCEDURE — 700111 HCHG RX REV CODE 636 W/ 250 OVERRIDE (IP): Performed by: FAMILY MEDICINE

## 2017-01-31 PROCEDURE — 700111 HCHG RX REV CODE 636 W/ 250 OVERRIDE (IP): Performed by: INTERNAL MEDICINE

## 2017-01-31 PROCEDURE — 72148 MRI LUMBAR SPINE W/O DYE: CPT

## 2017-01-31 PROCEDURE — 93306 TTE W/DOPPLER COMPLETE: CPT

## 2017-01-31 PROCEDURE — 80048 BASIC METABOLIC PNL TOTAL CA: CPT

## 2017-01-31 PROCEDURE — 82962 GLUCOSE BLOOD TEST: CPT | Mod: 91

## 2017-01-31 PROCEDURE — 700102 HCHG RX REV CODE 250 W/ 637 OVERRIDE(OP): Performed by: HOSPITALIST

## 2017-01-31 PROCEDURE — 700102 HCHG RX REV CODE 250 W/ 637 OVERRIDE(OP): Performed by: INTERNAL MEDICINE

## 2017-01-31 PROCEDURE — 93306 TTE W/DOPPLER COMPLETE: CPT | Mod: 26 | Performed by: INTERNAL MEDICINE

## 2017-01-31 PROCEDURE — 770020 HCHG ROOM/CARE - TELE (206)

## 2017-01-31 PROCEDURE — 84443 ASSAY THYROID STIM HORMONE: CPT

## 2017-01-31 PROCEDURE — 82306 VITAMIN D 25 HYDROXY: CPT

## 2017-01-31 RX ORDER — MECLIZINE HYDROCHLORIDE 25 MG/1
25 TABLET ORAL 3 TIMES DAILY PRN
Status: DISCONTINUED | OUTPATIENT
Start: 2017-01-31 | End: 2017-02-01 | Stop reason: HOSPADM

## 2017-01-31 RX ADMIN — STANDARDIZED SENNA CONCENTRATE AND DOCUSATE SODIUM 1 TABLET: 8.6; 5 TABLET, FILM COATED ORAL at 21:28

## 2017-01-31 RX ADMIN — ENOXAPARIN SODIUM 40 MG: 100 INJECTION SUBCUTANEOUS at 07:54

## 2017-01-31 RX ADMIN — MECLIZINE HYDROCHLORIDE 25 MG: 25 TABLET ORAL at 11:27

## 2017-01-31 RX ADMIN — LACTULOSE 30 ML: 10 SOLUTION ORAL at 07:54

## 2017-01-31 RX ADMIN — ONDANSETRON 4 MG: 4 TABLET, ORALLY DISINTEGRATING ORAL at 07:54

## 2017-01-31 RX ADMIN — LEVOTHYROXINE SODIUM 50 MCG: 50 TABLET ORAL at 06:09

## 2017-01-31 RX ADMIN — ZOLPIDEM TARTRATE 10 MG: 5 TABLET, FILM COATED ORAL at 21:28

## 2017-01-31 RX ADMIN — DOCUSATE SODIUM 100 MG: 100 CAPSULE ORAL at 07:54

## 2017-01-31 RX ADMIN — ALPRAZOLAM 1 MG: 1 TABLET ORAL at 21:28

## 2017-01-31 RX ADMIN — MECLIZINE HYDROCHLORIDE 25 MG: 25 TABLET ORAL at 18:11

## 2017-01-31 RX ADMIN — ASPIRIN 325 MG: 325 TABLET, COATED ORAL at 07:54

## 2017-01-31 ASSESSMENT — PAIN SCALES - GENERAL
PAINLEVEL_OUTOF10: 2
PAINLEVEL_OUTOF10: 3
PAINLEVEL_OUTOF10: 2
PAINLEVEL_OUTOF10: 0

## 2017-01-31 ASSESSMENT — ENCOUNTER SYMPTOMS
FEVER: 0
HEADACHES: 0
DIZZINESS: 1
NAUSEA: 0
VOMITING: 0
DIARRHEA: 0
CONSTIPATION: 0
MYALGIAS: 0
CHILLS: 0
WEAKNESS: 0
PALPITATIONS: 0
SHORTNESS OF BREATH: 0
COUGH: 0
ABDOMINAL PAIN: 0
FOCAL WEAKNESS: 0

## 2017-01-31 NOTE — PROGRESS NOTES
Hospital Medicine Progress Note, Adult, Complex               Author: Blaine CHELSY Alesia Date & Time created: 1/31/2017  2:14 PM     70 yo M with weakness/vertigo    Interval History:  1/31 - discussed with patient; his history is more consistent with vertigo (describes like being on a boat, etc), is triggered by turning or moving head. Asked PT to re-eval with Epley maneuver, started meclizine; advised we will watch him overnight. He also mentions his leg weakness has been that way his whole life.    Review of Systems:  Review of Systems   Constitutional: Negative for fever and chills.   Respiratory: Negative for cough and shortness of breath.    Cardiovascular: Negative for chest pain and palpitations.   Gastrointestinal: Negative for nausea, vomiting, abdominal pain, diarrhea and constipation.   Genitourinary: Negative for dysuria.   Musculoskeletal: Negative for myalgias.   Skin: Negative for itching.   Neurological: Positive for dizziness (seems more like vertigo). Negative for focal weakness, weakness and headaches.   All other systems reviewed and are negative.      Physical Exam:  Physical Exam   Constitutional: He is oriented to person, place, and time. He appears well-developed and well-nourished.   HENT:   Head: Normocephalic and atraumatic.   Mouth/Throat: Oropharynx is clear and moist.   Eyes: Conjunctivae and EOM are normal. Pupils are equal, round, and reactive to light. No scleral icterus.   Neck: Normal range of motion. Neck supple. No tracheal deviation present. No thyromegaly present.   Cardiovascular: Normal rate, regular rhythm, normal heart sounds and intact distal pulses.    No murmur heard.  Pulmonary/Chest: Effort normal and breath sounds normal. No respiratory distress. He has no wheezes.   Abdominal: Soft. Bowel sounds are normal. He exhibits no distension. There is no tenderness.   Musculoskeletal: Normal range of motion. He exhibits no edema or tenderness.   Lymphadenopathy:     He has no  cervical adenopathy.        Right: No supraclavicular adenopathy present.        Left: No supraclavicular adenopathy present.   Neurological: He is alert and oriented to person, place, and time. No cranial nerve deficit.   Skin: Skin is warm and dry.   Vitals reviewed.      Labs:        Invalid input(s): [id number]  Recent Labs      17   1038   TROPONINI  <0.01     Recent Labs      17   1038  17   0157  17   SODIUM  140  138  139   POTASSIUM  4.3  3.9  3.9   CHLORIDE  107  108  106   CO2  25  21  21   BUN  20  13  17   CREATININE  1.40  1.13  1.31   CALCIUM  8.8  8.9  9.2     Recent Labs      17   1038  17   01517   ALTSGPT  17   --    --    ASTSGOT  15   --    --    ALKPHOSPHAT  40   --    --    TBILIRUBIN  0.6   --    --    GLUCOSE  141*  107*  121*     Recent Labs      17   1038  17   RBC  5.80  5.81   HEMOGLOBIN  17.3  16.8   HEMATOCRIT  51.7  51.4   PLATELETCT  125*  135*   PROTHROMBTM  12.7   --    APTT  27.7   --    INR  0.93   --      Recent Labs      17   1038  17   WBC  4.6*  4.9   NEUTSPOLYS  69.90   --    LYMPHOCYTES  20.20*   --    MONOCYTES  6.40   --    EOSINOPHILS  2.20   --    BASOPHILS  1.10   --    ASTSGOT  15   --    ALTSGPT  17   --    ALKPHOSPHAT  40   --    TBILIRUBIN  0.6   --            Hemodynamics:  Temp (24hrs), Av.7 °C (98 °F), Min:36.3 °C (97.4 °F), Max:37 °C (98.6 °F)  Temperature: 37 °C (98.6 °F)  Pulse  Av.6  Min: 58  Max: 87   Blood Pressure : 118/65 mmHg     Respiratory:    Respiration: 18, Pulse Oximetry: 94 %           Fluids:    Intake/Output Summary (Last 24 hours) at 17 1414  Last data filed at 17 0445   Gross per 24 hour   Intake    350 ml   Output   1500 ml   Net  -1150 ml       GI/Nutrition:  Orders Placed This Encounter   Procedures   • DIET ORDER     Standing Status: Standing      Number of Occurrences: 1      Standing Expiration Date:      Order  Specific Question:  Diet:     Answer:  Diabetic [3]     Medical Decision Making, by Problem:  Active Hospital Problems    Diagnosis   • *Lower extremity weakness [M62.81]             MRI not impressive  Exam not impressive  Patient states this is unchanged since childhood  Seems to be more vertigo-induced; PT/OT, meclizine     • Hyperlipidemia [E78.5]           should be on statin     • DM2 (diabetes mellitus, type 2) (CMS-[facility]) [E11.9]       In-hospital FSBG goal less than 180 mg/dL  SSI qAC & qHS when eating, q6 when NPO  GLUCOSE - ACCU-CK   Date/Time Value Ref Range Status   01/31/2017 11:03 * 65 - 99 mg/dL Final   01/31/2017 06:01 * 65 - 99 mg/dL Final   01/30/2017 08:43 * 65 - 99 mg/dL Final   01/30/2017 04:11 * 65 - 99 mg/dL Final      • Renal insufficiency [N28.9]        IVF NS     • Subclavian artery stenosis (CMS-HCC) [I77.1]         Carotid duplex     • Chronic back pain [M54.9, G89.29]         PRN pain control     • HTN (hypertension) [I10]       SBP goal less than 140 mmHg  DBP goal less than 90 mmHg  PRN and antihypertensives to titrate by hospitalist towards goal  Most recent Blood Pressure : 118/65 mmHg      • Hypothyroidism [E03.9]        Synthroid     Dispo: pending    Medications reviewed and Labs reviewed        DVT Prophylaxis: Enoxaparin (Lovenox)    Ulcer prophylaxis: No

## 2017-01-31 NOTE — CARE PLAN
Problem: Safety  Goal: Will remain free from injury  Outcome: PROGRESSING AS EXPECTED  Encouraged to call for help. Bed alarms in place. Patient not left alone when up out of bed.    Problem: Psychosocial Needs:  Goal: Level of anxiety will decrease  Outcome: PROGRESSING AS EXPECTED  Discussed anxiety about problem and resolutions to help ease anxiety.

## 2017-01-31 NOTE — PROGRESS NOTES
Monitor Summary: SR 71-98, OR .20, QRS .10, QT .38 with frequent PVC and occasional couplet per strip from monitor room

## 2017-01-31 NOTE — PROGRESS NOTES
Assumed care around 1200, pt c/o of vertigo but no lower extremity weakness, NIH remains 0, bed alarm in place, but has steady gait and is just a sba  Vitals stable, c/o mild lumbar pain, awaiting lumbar mri, will continue to monitor and follow plan of care.

## 2017-01-31 NOTE — CARE PLAN
Problem: Safety  Goal: Will remain free from injury  Outcome: PROGRESSING AS EXPECTED  Fall risk precautions in place, pt calls for assistance    Problem: Bowel/Gastric:  Goal: Normal bowel function is maintained or improved  Outcome: MET Date Met:  01/31/17  Pt had bm

## 2017-01-31 NOTE — DISCHARGE PLANNING
Pt will likely DC home with no needs. SW remains available for any needs that may arise. If HH is needed, pt does have a PCP and [facility] covers [place].

## 2017-01-31 NOTE — PROGRESS NOTES
Patient alert and oriented. Patient did complain of dizziness with movement. Stand by assist for ambulatiuon. NIHSS scale preformed was was 1. PM ACCU check was 121. No calls with tele. Patient slept comfortable throughout the night and was cooperative with cares.

## 2017-02-01 ENCOUNTER — PATIENT OUTREACH (OUTPATIENT)
Dept: HEALTH INFORMATION MANAGEMENT | Facility: OTHER | Age: 72
End: 2017-02-01

## 2017-02-01 VITALS
TEMPERATURE: 97.8 F | RESPIRATION RATE: 18 BRPM | HEIGHT: 76 IN | SYSTOLIC BLOOD PRESSURE: 131 MMHG | BODY MASS INDEX: 35.36 KG/M2 | HEART RATE: 69 BPM | OXYGEN SATURATION: 93 % | DIASTOLIC BLOOD PRESSURE: 76 MMHG | WEIGHT: 290.35 LBS

## 2017-02-01 PROBLEM — N28.9 RENAL INSUFFICIENCY: Status: RESOLVED | Noted: 2017-01-30 | Resolved: 2017-02-01

## 2017-02-01 LAB
GLUCOSE BLD-MCNC: 108 MG/DL (ref 65–99)
GLUCOSE BLD-MCNC: 124 MG/DL (ref 65–99)

## 2017-02-01 PROCEDURE — A9270 NON-COVERED ITEM OR SERVICE: HCPCS | Performed by: INTERNAL MEDICINE

## 2017-02-01 PROCEDURE — 97535 SELF CARE MNGMENT TRAINING: CPT

## 2017-02-01 PROCEDURE — 99239 HOSP IP/OBS DSCHRG MGMT >30: CPT | Performed by: HOSPITALIST

## 2017-02-01 PROCEDURE — 82962 GLUCOSE BLOOD TEST: CPT

## 2017-02-01 PROCEDURE — 700102 HCHG RX REV CODE 250 W/ 637 OVERRIDE(OP): Performed by: INTERNAL MEDICINE

## 2017-02-01 PROCEDURE — 700102 HCHG RX REV CODE 250 W/ 637 OVERRIDE(OP): Performed by: HOSPITALIST

## 2017-02-01 PROCEDURE — 700111 HCHG RX REV CODE 636 W/ 250 OVERRIDE (IP): Performed by: FAMILY MEDICINE

## 2017-02-01 PROCEDURE — 95992 CANALITH REPOSITIONING PROC: CPT

## 2017-02-01 PROCEDURE — A9270 NON-COVERED ITEM OR SERVICE: HCPCS | Performed by: HOSPITALIST

## 2017-02-01 PROCEDURE — 700112 HCHG RX REV CODE 229: Performed by: INTERNAL MEDICINE

## 2017-02-01 RX ORDER — MECLIZINE HYDROCHLORIDE 25 MG/1
25 TABLET ORAL 3 TIMES DAILY PRN
Qty: 90 TAB | Refills: 0 | Status: SHIPPED | OUTPATIENT
Start: 2017-02-01 | End: 2017-08-09

## 2017-02-01 RX ADMIN — DOCUSATE SODIUM 100 MG: 100 CAPSULE ORAL at 08:45

## 2017-02-01 RX ADMIN — LEVOTHYROXINE SODIUM 50 MCG: 50 TABLET ORAL at 05:27

## 2017-02-01 RX ADMIN — MECLIZINE HYDROCHLORIDE 25 MG: 25 TABLET ORAL at 08:45

## 2017-02-01 RX ADMIN — ENOXAPARIN SODIUM 40 MG: 100 INJECTION SUBCUTANEOUS at 08:45

## 2017-02-01 RX ADMIN — ASPIRIN 325 MG: 325 TABLET, COATED ORAL at 08:45

## 2017-02-01 ASSESSMENT — PAIN SCALES - GENERAL: PAINLEVEL_OUTOF10: 0

## 2017-02-01 ASSESSMENT — GAIT ASSESSMENTS
DEVIATION: ANTALGIC;INCREASED BASE OF SUPPORT
DISTANCE (FEET): 500
GAIT LEVEL OF ASSIST: SUPERVISED

## 2017-02-01 NOTE — CARE PLAN
Problem: Safety  Goal: Will remain free from injury  Outcome: PROGRESSING AS EXPECTED  Pt calls for assistance when oob, bed alarm in place    Problem: Venous Thromboembolism (VTW)/Deep Vein Thrombosis (DVT) Prevention:  Goal: Patient will participate in Venous Thrombosis (VTE)/Deep Vein Thrombosis (DVT)Prevention Measures  Outcome: PROGRESSING AS EXPECTED

## 2017-02-01 NOTE — DISCHARGE INSTRUCTIONS
Discharge Instructions    Discharged to home by car with relative. Discharged via wheelchair, hospital escort: Yes.  Special equipment needed: Not Applicable    Be sure to schedule a follow-up appointment with your primary care doctor or any specialists as instructed.     Discharge Plan:   Diet Plan: Discussed  Activity Level: Discussed  Confirmed Follow up Appointment: Patient to Call and Schedule Appointment  Confirmed Symptoms Management: Discussed  Medication Reconciliation Updated: Yes  Influenza Vaccine Indication: Not indicated: Previously immunized this influenza season and > 8 years of age    I understand that a diet low in cholesterol, fat, and sodium is recommended for good health. Unless I have been given specific instructions below for another diet, I accept this instruction as my diet prescription.   Other diet: Diabetic      Special Instructions: None    · Is patient discharged on Warfarin / Coumadin?   No     · Is patient Post Blood Transfusion?  No    Depression / Suicide Risk    As you are discharged from this [facility] facility, it is important to learn how to keep safe from harming yourself.    Recognize the warning signs:  · Abrupt changes in personality, positive or negative- including increase in energy   · Giving away possessions  · Change in eating patterns- significant weight changes-  positive or negative  · Change in sleeping patterns- unable to sleep or sleeping all the time   · Unwillingness or inability to communicate  · Depression  · Unusual sadness, discouragement and loneliness  · Talk of wanting to die  · Neglect of personal appearance   · Rebelliousness- reckless behavior  · Withdrawal from people/activities they love  · Confusion- inability to concentrate     If you or a loved one observes any of these behaviors or has concerns about self-harm, here's what you can do:  · Talk about it- your feelings and reasons for harming yourself  · Remove any means that you might use to  hurt yourself (examples: pills, rope, extension cords, firearm)  · Get professional help from the community (Mental Health, Substance Abuse, psychological counseling)  · Do not be alone:Call your Safe Contact- someone whom you trust who will be there for you.  · Call your local CRISIS HOTLINE 282-4353 or 501-558-1971  · Call your local Children's Mobile Crisis Response Team [place] (651) 858-9278 or www.[employer]  · Call the toll free National Suicide Prevention Hotlines   · National Suicide Prevention Lifeline 656-568-NNXQ (3574)  · National Hope Line Network 800-SUICIDE (816-4172)

## 2017-02-01 NOTE — PROGRESS NOTES
Monitor Summary: SR 63-84, CO .22, QRS .10, QT .40 with bigem,, frequent PVCs, rare couplets, 5 beats of VTACH at 1200, and 1st degree HB at 1400 per strip from monitor room

## 2017-02-01 NOTE — PROGRESS NOTES
Lumbar mri completed today along with echo, results pending. Started on meclizine today and patient states he is feeling a little better. Had some nausea and constipation this am, given bowel regimen and patient was able to get relief after zofran and bowel movement. Vitals stable, will continue to monitor.

## 2017-02-02 NOTE — DISCHARGE SUMMARY
ADMITTING DIAGNOSES:  Stroke, hypertension, diabetes, hypothyroidism.    DISCHARGE DIAGNOSES:  Stroke, ruled out vertigo causing lower extremity   weakness and stroke like symptoms, hyperlipidemia, diabetes mellitus type 2,   subclavian artery stenosis, ruled out; chronic back pain, hypertension.    CONSULTATIONS:  None.    PROCEDURES:  None.    DISCHARGE DIET:  2 g sodium, heart healthy as tolerated.    ACTIVITY:  As tolerated.    FOLLOWUP:  Follow up with his primary care provider preferably within 1-2   weeks with hospital discharge.    HOME MEDICATIONS:  Meclizine 25 mg 3 times daily as needed for vertigo.  He   was given 90 tablets with 2 refills.  He is also to continue his home   medications including [place], Xanax 1 mg at bedtime, Ambien 10 mg at   bedtime, Norvasc 10 mg daily, atenolol 25 mg daily, B complex vitamins,   benazepril 10 mg daily, glipizide 5 mg twice daily, Krill oil 1 g daily,   levothyroxine 50 mcg in the morning on an empty stomach, pioglitazone 30 mg in   the evening, vitamin D 2000 international units daily.    HISTORY OF PRESENTING ILLNESS AND HOSPITAL COURSE:  This is a 71-year-old   gentleman with stroke-like symptoms; however, his echocardiogram was   essentially benign.  His carotid ultrasound was essentially benign.  An MRI   did not show significant findings with a questionable focal restricted   diffusion in the superior cerebellar peduncle.  This, however, did not   correspond with his symptoms.  He was started on meclizine for vertigo after a   lumbar MRI demonstrated no clinically significant spinal cord narrowing or   spinal stenosis.  He had marked improvement with meclizine such that he felt   sitting on his feet and had no further symptoms; therefore, he was able to be   discharged to home in good and stable condition.    Total time of discharge is 33 minutes.       ____________________________________     MD ELKE Briseno / TMOY    DD:  02/01/2017  13:46:56  DT:  02/02/2017 11:42:31    D#:  [id number]  Job#:  [id number]

## 2017-02-03 ENCOUNTER — PATIENT OUTREACH (OUTPATIENT)
Dept: HEALTH INFORMATION MANAGEMENT | Facility: OTHER | Age: 72
End: 2017-02-03

## 2017-02-03 NOTE — PROGRESS NOTES
Telephone contact w/Corbin, provided an introduction of myself; my role and the reason for my call.    Corbin gives verbal consent for CM services at this time for oversight of his medical conditions  for 30 days post hospitalization rural care coordination.                       Corbin states that he is doing well.    Corbin states he is taking all meds as prescribed.     PCP is Melany Smith. Corbin will see Melany Smith 2/8/17 .    Developed goals with Corbin provided him with my contact information.    Plan to follow up with Corbin in approx two weeks; in the interim,  Corbin is able to call me with questions or concerns.

## 2017-02-05 LAB — EKG IMPRESSION: NORMAL

## 2017-02-21 ENCOUNTER — PATIENT OUTREACH (OUTPATIENT)
Dept: HEALTH INFORMATION MANAGEMENT | Facility: OTHER | Age: 72
End: 2017-02-21

## 2017-02-22 NOTE — PROGRESS NOTES
"Telephone contact with Corbin for rural care coordination.    Corbin states he is doing well. States he has had \"3 attacks\" in the last few weeks. He saw his PCP, who referred him to Dr. Haddad (ENT). Corbin is waiting for them to call to schedule appt. States that attacks were over quickly and he did not feel he needed emergency services.    Corbin states that he has questions regarding billing from [facility]. Referred him to billing department.    Plan to follow up with Corbin in approx two weeks; in the interim, Corbin is able to call me with questions or concerns.      "

## 2017-03-07 ENCOUNTER — PATIENT OUTREACH (OUTPATIENT)
Dept: HEALTH INFORMATION MANAGEMENT | Facility: OTHER | Age: 72
End: 2017-03-07

## 2017-03-16 ENCOUNTER — PATIENT OUTREACH (OUTPATIENT)
Dept: HEALTH INFORMATION MANAGEMENT | Facility: OTHER | Age: 72
End: 2017-03-16

## 2017-03-16 NOTE — PROGRESS NOTES
Telephone contact with Corbin for 30 day rural care coordination.    Corbin states he is doing very well. States the dizziness has gone away.    Corbin will f/u with ENT on 3/30/17.    Corbin has no other questions or concerns. Explained will be signing off case now as Corbin has meet goals for rural care coordination program. Verbalized understanding. Educated to f/u with PCP for medical care. Verbalized understanding.

## 2017-08-09 ENCOUNTER — HOSPITAL ENCOUNTER (OUTPATIENT)
Dept: RADIOLOGY | Facility: MEDICAL CENTER | Age: 72
End: 2017-08-09

## 2017-08-09 ENCOUNTER — HOSPITAL ENCOUNTER (OUTPATIENT)
Facility: MEDICAL CENTER | Age: 72
End: 2017-08-10
Attending: EMERGENCY MEDICINE | Admitting: SURGERY
Payer: MEDICARE

## 2017-08-09 ENCOUNTER — APPOINTMENT (OUTPATIENT)
Dept: RADIOLOGY | Facility: MEDICAL CENTER | Age: 72
End: 2017-08-09
Attending: EMERGENCY MEDICINE
Payer: MEDICARE

## 2017-08-09 DIAGNOSIS — K80.00 CALCULUS OF GALLBLADDER WITH ACUTE CHOLECYSTITIS WITHOUT OBSTRUCTION: ICD-10-CM

## 2017-08-09 PROBLEM — K81.0 ACUTE CHOLECYSTITIS: Status: ACTIVE | Noted: 2017-08-09

## 2017-08-09 PROBLEM — E66.9 OBESITY (BMI 30.0-34.9): Status: ACTIVE | Noted: 2017-08-09

## 2017-08-09 LAB
CFT BLD TEG: 6.5 MIN (ref 5–10)
CLOT ANGLE BLD TEG: 56.5 DEGREES (ref 53–72)
CLOT LYSIS 30M P MA LENFR BLD TEG: 0.5 % (ref 0–8)
CT.EXTRINSIC BLD ROTEM: 2.5 MIN (ref 1–3)
GLUCOSE BLD-MCNC: 149 MG/DL (ref 65–99)
GLUCOSE BLD-MCNC: 159 MG/DL (ref 65–99)
MCF BLD TEG: 55.5 MM (ref 50–70)
PA AA BLD-ACNC: 33.8 %
PA ADP BLD-ACNC: 12.9 %
TEG ALGORITHM TGALG: NORMAL

## 2017-08-09 PROCEDURE — 93005 ELECTROCARDIOGRAM TRACING: CPT | Performed by: SURGERY

## 2017-08-09 PROCEDURE — 85347 COAGULATION TIME ACTIVATED: CPT

## 2017-08-09 PROCEDURE — 501583 HCHG TROCAR, THRD CAN&SEAL 5X100: Performed by: SURGERY

## 2017-08-09 PROCEDURE — 76705 ECHO EXAM OF ABDOMEN: CPT

## 2017-08-09 PROCEDURE — 501572 HCHG TROCAR, SHIELD OBTU 5X100: Performed by: SURGERY

## 2017-08-09 PROCEDURE — 96367 TX/PROPH/DG ADDL SEQ IV INF: CPT | Mod: XU

## 2017-08-09 PROCEDURE — 501445 HCHG STAPLER, SKIN DISP: Performed by: SURGERY

## 2017-08-09 PROCEDURE — 700105 HCHG RX REV CODE 258: Performed by: SURGERY

## 2017-08-09 PROCEDURE — 82962 GLUCOSE BLOOD TEST: CPT

## 2017-08-09 PROCEDURE — 500122 HCHG BOVIE, BLADE: Performed by: SURGERY

## 2017-08-09 PROCEDURE — 700101 HCHG RX REV CODE 250

## 2017-08-09 PROCEDURE — 502571 HCHG PACK, LAP CHOLE: Performed by: SURGERY

## 2017-08-09 PROCEDURE — 85576 BLOOD PLATELET AGGREGATION: CPT | Mod: 91

## 2017-08-09 PROCEDURE — G0378 HOSPITAL OBSERVATION PER HR: HCPCS

## 2017-08-09 PROCEDURE — 700111 HCHG RX REV CODE 636 W/ 250 OVERRIDE (IP)

## 2017-08-09 PROCEDURE — 85384 FIBRINOGEN ACTIVITY: CPT

## 2017-08-09 PROCEDURE — 500697 HCHG HEMOCLIP, LARGE (ORANGE): Performed by: SURGERY

## 2017-08-09 PROCEDURE — 36415 COLL VENOUS BLD VENIPUNCTURE: CPT

## 2017-08-09 PROCEDURE — 96366 THER/PROPH/DIAG IV INF ADDON: CPT | Mod: XU

## 2017-08-09 PROCEDURE — 700101 HCHG RX REV CODE 250: Performed by: EMERGENCY MEDICINE

## 2017-08-09 PROCEDURE — 160048 HCHG OR STATISTICAL LEVEL 1-5: Performed by: SURGERY

## 2017-08-09 PROCEDURE — 700111 HCHG RX REV CODE 636 W/ 250 OVERRIDE (IP): Performed by: SURGERY

## 2017-08-09 PROCEDURE — 160009 HCHG ANES TIME/MIN: Performed by: SURGERY

## 2017-08-09 PROCEDURE — 160035 HCHG PACU - 1ST 60 MINS PHASE I: Performed by: SURGERY

## 2017-08-09 PROCEDURE — 501399 HCHG SPECIMAN BAG, ENDO CATC: Performed by: SURGERY

## 2017-08-09 PROCEDURE — 700102 HCHG RX REV CODE 250 W/ 637 OVERRIDE(OP)

## 2017-08-09 PROCEDURE — A9270 NON-COVERED ITEM OR SERVICE: HCPCS | Performed by: SURGERY

## 2017-08-09 PROCEDURE — A6402 STERILE GAUZE <= 16 SQ IN: HCPCS | Performed by: SURGERY

## 2017-08-09 PROCEDURE — 501582 HCHG TROCAR, THRD BLADED: Performed by: SURGERY

## 2017-08-09 PROCEDURE — 96375 TX/PRO/DX INJ NEW DRUG ADDON: CPT | Mod: XU

## 2017-08-09 PROCEDURE — 93010 ELECTROCARDIOGRAM REPORT: CPT | Performed by: INTERNAL MEDICINE

## 2017-08-09 PROCEDURE — 88304 TISSUE EXAM BY PATHOLOGIST: CPT

## 2017-08-09 PROCEDURE — 94760 N-INVAS EAR/PLS OXIMETRY 1: CPT

## 2017-08-09 PROCEDURE — A9270 NON-COVERED ITEM OR SERVICE: HCPCS

## 2017-08-09 PROCEDURE — 160039 HCHG SURGERY MINUTES - EA ADDL 1 MIN LEVEL 3: Performed by: SURGERY

## 2017-08-09 PROCEDURE — 700102 HCHG RX REV CODE 250 W/ 637 OVERRIDE(OP): Performed by: SURGERY

## 2017-08-09 PROCEDURE — 500698 HCHG HEMOCLIP, MEDIUM: Performed by: SURGERY

## 2017-08-09 PROCEDURE — 160028 HCHG SURGERY MINUTES - 1ST 30 MINS LEVEL 3: Performed by: SURGERY

## 2017-08-09 PROCEDURE — 96365 THER/PROPH/DIAG IV INF INIT: CPT | Mod: XU

## 2017-08-09 PROCEDURE — 501838 HCHG SUTURE GENERAL: Performed by: SURGERY

## 2017-08-09 PROCEDURE — 160002 HCHG RECOVERY MINUTES (STAT): Performed by: SURGERY

## 2017-08-09 PROCEDURE — 99285 EMERGENCY DEPT VISIT HI MDM: CPT

## 2017-08-09 PROCEDURE — 700111 HCHG RX REV CODE 636 W/ 250 OVERRIDE (IP): Performed by: EMERGENCY MEDICINE

## 2017-08-09 PROCEDURE — 160036 HCHG PACU - EA ADDL 30 MINS PHASE I: Performed by: SURGERY

## 2017-08-09 RX ORDER — BUPIVACAINE HYDROCHLORIDE AND EPINEPHRINE 5; 5 MG/ML; UG/ML
INJECTION, SOLUTION EPIDURAL; INTRACAUDAL; PERINEURAL
Status: DISCONTINUED | OUTPATIENT
Start: 2017-08-09 | End: 2017-08-09 | Stop reason: HOSPADM

## 2017-08-09 RX ORDER — LEVOTHYROXINE SODIUM 137 UG/1
137 TABLET ORAL
COMMUNITY
End: 2018-09-15

## 2017-08-09 RX ORDER — CEFTRIAXONE 1 G/1
1 INJECTION, POWDER, FOR SOLUTION INTRAMUSCULAR; INTRAVENOUS ONCE
Status: COMPLETED | OUTPATIENT
Start: 2017-08-09 | End: 2017-08-09

## 2017-08-09 RX ORDER — FAMOTIDINE 20 MG/1
20 TABLET, FILM COATED ORAL 2 TIMES DAILY
Status: DISCONTINUED | OUTPATIENT
Start: 2017-08-09 | End: 2017-08-10

## 2017-08-09 RX ORDER — CHLORHEXIDINE GLUCONATE ORAL RINSE 1.2 MG/ML
15 SOLUTION DENTAL EVERY 12 HOURS
Status: DISCONTINUED | OUTPATIENT
Start: 2017-08-09 | End: 2017-08-09

## 2017-08-09 RX ORDER — BISACODYL 10 MG
10 SUPPOSITORY, RECTAL RECTAL
Status: DISCONTINUED | OUTPATIENT
Start: 2017-08-09 | End: 2017-08-10 | Stop reason: HOSPADM

## 2017-08-09 RX ORDER — AMOXICILLIN 250 MG
1 CAPSULE ORAL
Status: DISCONTINUED | OUTPATIENT
Start: 2017-08-09 | End: 2017-08-10 | Stop reason: HOSPADM

## 2017-08-09 RX ORDER — ACETAMINOPHEN 500 MG
1000 TABLET ORAL EVERY 6 HOURS
Status: DISCONTINUED | OUTPATIENT
Start: 2017-08-09 | End: 2017-08-10 | Stop reason: HOSPADM

## 2017-08-09 RX ORDER — POLYETHYLENE GLYCOL 3350 17 G/17G
1 POWDER, FOR SOLUTION ORAL 2 TIMES DAILY
Status: DISCONTINUED | OUTPATIENT
Start: 2017-08-09 | End: 2017-08-10 | Stop reason: HOSPADM

## 2017-08-09 RX ORDER — CELECOXIB 200 MG/1
200 CAPSULE ORAL 2 TIMES DAILY
Status: DISCONTINUED | OUTPATIENT
Start: 2017-08-09 | End: 2017-08-10 | Stop reason: HOSPADM

## 2017-08-09 RX ORDER — ONDANSETRON 2 MG/ML
4 INJECTION INTRAMUSCULAR; INTRAVENOUS EVERY 6 HOURS PRN
Status: DISCONTINUED | OUTPATIENT
Start: 2017-08-09 | End: 2017-08-10 | Stop reason: HOSPADM

## 2017-08-09 RX ORDER — AMOXICILLIN 250 MG
1 CAPSULE ORAL NIGHTLY
Status: DISCONTINUED | OUTPATIENT
Start: 2017-08-09 | End: 2017-08-10 | Stop reason: HOSPADM

## 2017-08-09 RX ORDER — ENEMA 19; 7 G/133ML; G/133ML
1 ENEMA RECTAL
Status: DISCONTINUED | OUTPATIENT
Start: 2017-08-09 | End: 2017-08-10 | Stop reason: HOSPADM

## 2017-08-09 RX ORDER — DOCUSATE SODIUM 100 MG/1
100 CAPSULE, LIQUID FILLED ORAL 2 TIMES DAILY
Status: DISCONTINUED | OUTPATIENT
Start: 2017-08-09 | End: 2017-08-10 | Stop reason: HOSPADM

## 2017-08-09 RX ORDER — OXYCODONE HCL 5 MG/5 ML
SOLUTION, ORAL ORAL
Status: COMPLETED
Start: 2017-08-09 | End: 2017-08-09

## 2017-08-09 RX ORDER — ATENOLOL 25 MG/1
TABLET ORAL
Status: COMPLETED
Start: 2017-08-09 | End: 2017-08-09

## 2017-08-09 RX ORDER — SODIUM CHLORIDE, SODIUM LACTATE, POTASSIUM CHLORIDE, CALCIUM CHLORIDE 600; 310; 30; 20 MG/100ML; MG/100ML; MG/100ML; MG/100ML
INJECTION, SOLUTION INTRAVENOUS CONTINUOUS
Status: DISCONTINUED | OUTPATIENT
Start: 2017-08-09 | End: 2017-08-10

## 2017-08-09 RX ADMIN — ACETAMINOPHEN 1000 MG: 500 TABLET ORAL at 18:34

## 2017-08-09 RX ADMIN — PIPERACILLIN SODIUM AND TAZOBACTAM SODIUM 3.38 G: 3; .375 INJECTION, POWDER, FOR SOLUTION INTRAVENOUS at 13:12

## 2017-08-09 RX ADMIN — METRONIDAZOLE 500 MG: 500 INJECTION, SOLUTION INTRAVENOUS at 07:06

## 2017-08-09 RX ADMIN — CEFTRIAXONE 1 G: 1 INJECTION, POWDER, FOR SOLUTION INTRAMUSCULAR; INTRAVENOUS at 06:38

## 2017-08-09 RX ADMIN — FENTANYL CITRATE 50 MCG: 50 INJECTION, SOLUTION INTRAMUSCULAR; INTRAVENOUS at 12:24

## 2017-08-09 RX ADMIN — SODIUM CHLORIDE, POTASSIUM CHLORIDE, SODIUM LACTATE AND CALCIUM CHLORIDE: 600; 310; 30; 20 INJECTION, SOLUTION INTRAVENOUS at 08:22

## 2017-08-09 RX ADMIN — PIPERACILLIN SODIUM AND TAZOBACTAM SODIUM 3.38 G: 3; .375 INJECTION, POWDER, FOR SOLUTION INTRAVENOUS at 19:30

## 2017-08-09 RX ADMIN — FAMOTIDINE 20 MG: 10 INJECTION, SOLUTION INTRAVENOUS at 12:19

## 2017-08-09 RX ADMIN — POLYETHYLENE GLYCOL 3350 1 PACKET: 17 POWDER, FOR SOLUTION ORAL at 21:16

## 2017-08-09 RX ADMIN — FAMOTIDINE 20 MG: 20 TABLET, FILM COATED ORAL at 21:16

## 2017-08-09 RX ADMIN — PIPERACILLIN SODIUM AND TAZOBACTAM SODIUM 3.38 G: 3; .375 INJECTION, POWDER, FOR SOLUTION INTRAVENOUS at 08:16

## 2017-08-09 RX ADMIN — FENTANYL CITRATE 50 MCG: 50 INJECTION, SOLUTION INTRAMUSCULAR; INTRAVENOUS at 08:30

## 2017-08-09 RX ADMIN — ATENOLOL 25 MG: 25 TABLET ORAL at 14:43

## 2017-08-09 RX ADMIN — CELECOXIB 200 MG: 200 CAPSULE ORAL at 21:16

## 2017-08-09 ASSESSMENT — PAIN SCALES - GENERAL
PAINLEVEL_OUTOF10: 7
PAINLEVEL_OUTOF10: 7
PAINLEVEL_OUTOF10: 8
PAINLEVEL_OUTOF10: 4
PAINLEVEL_OUTOF10: 2
PAINLEVEL_OUTOF10: 0
PAINLEVEL_OUTOF10: 2
PAINLEVEL_OUTOF10: 4
PAINLEVEL_OUTOF10: 3

## 2017-08-09 ASSESSMENT — LIFESTYLE VARIABLES
CONSUMPTION TOTAL: NEGATIVE
DO YOU DRINK ALCOHOL: NO
ON A TYPICAL DAY WHEN YOU DRINK ALCOHOL HOW MANY DRINKS DO YOU HAVE: 0
ALCOHOL_USE: YES
AVERAGE NUMBER OF DAYS PER WEEK YOU HAVE A DRINK CONTAINING ALCOHOL: 0
TOTAL SCORE: 0
TOTAL SCORE: 0
HAVE YOU EVER FELT YOU SHOULD CUT DOWN ON YOUR DRINKING: NO
EVER FELT BAD OR GUILTY ABOUT YOUR DRINKING: NO
TOTAL SCORE: 0
HAVE PEOPLE ANNOYED YOU BY CRITICIZING YOUR DRINKING: NO
EVER HAD A DRINK FIRST THING IN THE MORNING TO STEADY YOUR NERVES TO GET RID OF A HANGOVER: NO
HOW MANY TIMES IN THE PAST YEAR HAVE YOU HAD 5 OR MORE DRINKS IN A DAY: 0
EVER_SMOKED: YES

## 2017-08-09 ASSESSMENT — PATIENT HEALTH QUESTIONNAIRE - PHQ9
2. FEELING DOWN, DEPRESSED, IRRITABLE, OR HOPELESS: NOT AT ALL
SUM OF ALL RESPONSES TO PHQ9 QUESTIONS 1 AND 2: 0
SUM OF ALL RESPONSES TO PHQ QUESTIONS 1-9: 0
1. LITTLE INTEREST OR PLEASURE IN DOING THINGS: NOT AT ALL

## 2017-08-09 NOTE — CARE PLAN
Problem: Safety  Goal: Will remain free from injury  Outcome: PROGRESSING AS EXPECTED  Patient educated on the use of call light and asked to call prior to getting out of bed.    Problem: Pain Management  Goal: Pain level will decrease to patient’s comfort goal  Outcome: PROGRESSING AS EXPECTED  Patient will be medicated per MAR

## 2017-08-09 NOTE — ED NOTES
Assumed care of patient. Patient complains of RUQ abdominal pain x 2030 last night and sent from [facility] for further evaluation.  Patient alert and oriented x 4. Patient denies any other complaints at this time. Patient side rails up x 2 and call bell within reach.

## 2017-08-09 NOTE — IP AVS SNAPSHOT
[employer] Access Code: [email or web address]  Expires: 9/9/2017 11:49 AM    [employer]ix  A secure, online tool to manage your health information     [employer]’s Watertronix® is a secure, online tool that connects you to your personalized health information from the privacy of your home -- day or night - making it very easy for you to manage your healthcare. Once the activation process is completed, you can even access your medical information using the Watertronix mell, which is available for free in the Apple Mell store or Google Play store.     Watertronix provides the following levels of access (as shown below):   My Chart Features   [facility] Primary Care Doctor [facility]  Specialists [facility]  Urgent  Care Non-[facility]  Primary Care  Doctor   Email your healthcare team securely and privately 24/7 X X X X   Manage appointments: schedule your next appointment; view details of past/upcoming appointments X      Request prescription refills. X      View recent personal medical records, including lab and immunizations X X X X   View health record, including health history, allergies, medications X X X X   Read reports about your outpatient visits, procedures, consult and ER notes X X X X   See your discharge summary, which is a recap of your hospital and/or ER visit that includes your diagnosis, lab results, and care plan. X X       How to register for Water[employer]:  1. Go to  [email or web address].  2. Click on the Sign Up Now box, which takes you to the New Member Sign Up page. You will need to provide the following information:  a. Enter your Water[employer] Access Code exactly as it appears at the top of this page. (You will not need to use this code after you’ve completed the sign-up process. If you do not sign up before the expiration date, you must request a new code.)   b. Enter your date of birth.   c. Enter your home email address.   d. Click Submit, and follow the next screen’s instructions.  3. Create a Watertronix ID. This will be your Watertronix  login ID and cannot be changed, so think of one that is secure and easy to remember.  4. Create a [employer] password. You can change your password at any time.  5. Enter your Password Reset Question and Answer. This can be used at a later time if you forget your password.   6. Enter your e-mail address. This allows you to receive e-mail notifications when new information is available in [employer].  7. Click Sign Up. You can now view your health information.    For assistance activating your [employer] account, call [phone]

## 2017-08-09 NOTE — IP AVS SNAPSHOT
" <p align=\"LEFT\"><IMG SRC=\"//[email or web address]\" alt=\"Image\" WIDTH=\"50%\" HEIGHT=\"200\" BORDER=\"\"></p>                   Name:Corbin Schwarz  Medical Record Number:[id number]  CSN: [id number]    YOB: 1945   Age: 71 y.o.  Sex: male  HT:1.905 m (6' 3\") WT: 132.2 kg (291 lb 7.2 oz)          Admit Date: 8/9/2017     Discharge Date:   Today's Date: 8/10/2017  Attending Doctor:  Rian Bearden M.D.                  Allergies:  Codeine; Hydrocodone; Oxycodone; Cymbalta; Statins; and Tramadol          Follow-up Information     1. Follow up with Rian Bearden M.D.. Schedule an appointment as soon as possible for a visit in 1 week.    Specialty:  Surgery    Contact information    [address]  [address]  [facility] [address] [phone]          2. Follow up with AFSANEH Hunter.    Specialty:  Family Medicine    Contact information    [address] [facility] [id number] [phone]           Medication List      Take these Medications        Instructions    VIK-SELTZER PLUS COLD PO    Take 2 Tabs by mouth every bedtime.   Dose:  2 Tab       alprazolam 1 MG Tabs   Commonly known as:  XANAX    Take 1 mg by mouth every bedtime.   Dose:  1 mg       AMBIEN 10 MG Tabs   Generic drug:  zolpidem    Take 10 mg by mouth every bedtime. Indications: Trouble Sleeping   Dose:  10 mg       amlodipine 10 MG Tabs   Commonly known as:  NORVASC    Take 10 mg by mouth every day.   Dose:  10 mg       atenolol 25 MG Tabs   Commonly known as:  TENORMIN    Take 25 mg by mouth every day.   Dose:  25 mg       benazepril 10 MG Tabs   Commonly known as:  LOTENSIN    Take 10 mg by mouth every day.   Dose:  10 mg       celecoxib 200 MG Caps   Commonly known as:  CELEBREX    Take 1 Cap by mouth 2 Times a Day for 7 days.   Dose:  200 mg       glipiZIDE 5 MG Tabs   Commonly known as:  GLUCOTROL    Take 5 mg by mouth every evening.   Dose:  5 mg       Krill Oil 1000 MG Caps    Take 1,000 mg by mouth every day.   "   Dose:  1000 mg       levothyroxine 137 MCG Tabs   Commonly known as:  SYNTHROID    Take 137 mcg by mouth Every morning on an empty stomach.   Dose:  137 mcg       pioglitazone 30 MG Tabs   Commonly known as:  ACTOS    Take 30 mg by mouth every evening.   Dose:  30 mg       Vitamin D3 2000 UNIT Caps    Take 2,000 Units by mouth every day.   Dose:  2000 Units         Ask your Physician about these medications        Instructions    aspirin EC 81 MG Tbec   Commonly known as:  ECOTRIN    Take 81 mg by mouth every day.   Dose:  81 mg

## 2017-08-09 NOTE — ED NOTES
Report recvd from Kimi HERNANDEZ, patient rersting comfortably, explained that he will be going to surgery,  Called OR to tell patient time, not on sched yet, explained he is NPO, given oral green swabs w cup of ice water

## 2017-08-09 NOTE — PROGRESS NOTES
Report received from ER nurse, patient is alert and oriented times 4, complaining of pain in the upper abdomen radiating to his back.  Patient was oriented to the floor and is comfortable at this time.  Patient possibly to surgery today.

## 2017-08-09 NOTE — IP AVS SNAPSHOT
" Home Care Instructions                                                                                                                  Name:Corbin Schwarz  Medical Record Number:[id number]  CSN: [id number]    YOB: 1945   Age: 71 y.o.  Sex: male  HT:1.905 m (6' 3\") WT: 132.2 kg (291 lb 7.2 oz)          Admit Date: 8/9/2017     Discharge Date:   Today's Date: 8/10/2017  Attending Doctor:  Rian Bearden M.D.                  Allergies:  Codeine; Hydrocodone; Oxycodone; Cymbalta; Statins; and Tramadol            Discharge Instructions       Discharge Instructions    Discharged to home by car with relative. Discharged via wheelchair, hospital escort: Yes.  Special equipment needed: Not Applicable    Be sure to schedule a follow-up appointment with your primary care doctor or any specialists as instructed.     Discharge Plan:   Influenza Vaccine Indication: Not indicated: Previously immunized this influenza season and > 8 years of age    I understand that a diet low in cholesterol, fat, and sodium is recommended for good health. Unless I have been given specific instructions below for another diet, I accept this instruction as my diet prescription.   Other diet: regular low fat diet    Special Instructions: None    · Is patient discharged on Warfarin / Coumadin?   No     · Is patient Post Blood Transfusion?  No    Depression / Suicide Risk    As you are discharged from this [facility] facility, it is important to learn how to keep safe from harming yourself.    Recognize the warning signs:  · Abrupt changes in personality, positive or negative- including increase in energy   · Giving away possessions  · Change in eating patterns- significant weight changes-  positive or negative  · Change in sleeping patterns- unable to sleep or sleeping all the time   · Unwillingness or inability to communicate  · Depression  · Unusual sadness, discouragement and loneliness  · Talk of wanting to die  · Neglect of " personal appearance   · Rebelliousness- reckless behavior  · Withdrawal from people/activities they love  · Confusion- inability to concentrate     If you or a loved one observes any of these behaviors or has concerns about self-harm, here's what you can do:  · Talk about it- your feelings and reasons for harming yourself  · Remove any means that you might use to hurt yourself (examples: pills, rope, extension cords, firearm)  · Get professional help from the community (Mental Health, Substance Abuse, psychological counseling)  · Do not be alone:Call your Safe Contact- someone whom you trust who will be there for you.  · Call your local CRISIS HOTLINE 167-2953 or 496-082-3049  · Call your local Children's Mobile Crisis Response Team [place] (139) 082-8880 or www.ENDOGENX  · Call the toll free National Suicide Prevention Hotlines   · National Suicide Prevention Lifeline 683-231-MPFU (4675)  · [employer] Line Network 800-SUICIDE (067-8675)    Laparoscopic Cholecystectomy  Laparoscopic cholecystectomy is surgery to remove the gallbladder. The gallbladder is located in the upper right part of the abdomen, behind the liver. It is a storage sac for bile produced in the liver. Bile aids in the digestion and absorption of fats. Cholecystectomy is often done for inflammation of the gallbladder (cholecystitis). This condition is usually caused by a buildup of gallstones (cholelithiasis) in your gallbladder. Gallstones can block the flow of bile, resulting in inflammation and pain. In severe cases, emergency surgery may be required. When emergency surgery is not required, you will have time to prepare for the procedure.  Laparoscopic surgery is an alternative to open surgery. Laparoscopic surgery has a shorter recovery time. Your common bile duct may also need to be examined during the procedure. If stones are found in the common bile duct, they may be removed.  LET YOUR HEALTH CARE PROVIDER KNOW ABOUT:  · Any  allergies you have.  · All medicines you are taking, including vitamins, herbs, eye drops, creams, and over-the-counter medicines.  · Previous problems you or members of your family have had with the use of anesthetics.  · Any blood disorders you have.  · Previous surgeries you have had.  · Medical conditions you have.  RISKS AND COMPLICATIONS  Generally, this is a safe procedure. However, as with any procedure, complications can occur. Possible complications include:  · Infection.  · Damage to the common bile duct, nerves, arteries, veins, or other internal organs such as the stomach, liver, or intestines.  · Bleeding.  · A stone may remain in the common bile duct.  · A bile leak from the cyst duct that is clipped when your gallbladder is removed.  · The need to convert to open surgery, which requires a larger incision in the abdomen. This may be necessary if your surgeon thinks it is not safe to continue with a laparoscopic procedure.  BEFORE THE PROCEDURE  · Ask your health care provider about changing or stopping any regular medicines. You will need to stop taking aspirin or blood thinners at least 5 days prior to surgery.  · Do not eat or drink anything after midnight the night before surgery.  · Let your health care provider know if you develop a cold or other infectious problem before surgery.  PROCEDURE   · You will be given medicine to make you sleep through the procedure (general anesthetic). A breathing tube will be placed in your mouth.  · When you are asleep, your surgeon will make several small cuts (incisions) in your abdomen.  · A thin, lighted tube with a tiny camera on the end (laparoscope) is inserted through one of the small incisions. The camera on the laparoscope sends a picture to a TV screen in the operating room. This gives the surgeon a good view inside your abdomen.  · A gas will be pumped into your abdomen. This expands your abdomen so that the surgeon has more room to perform the  surgery.  · Other tools needed for the procedure are inserted through the other incisions. The gallbladder is removed through one of the incisions.  · After the removal of your gallbladder, the incisions will be closed with stitches, staples, or skin glue.  AFTER THE PROCEDURE  · You will be taken to a recovery area where your progress will be checked often.  · You may be allowed to go home the same day if your pain is controlled and you can tolerate liquids.     This information is not intended to replace advice given to you by your health care provider. Make sure you discuss any questions you have with your health care provider.     Document Released: 12/18/2006 Document Revised: 10/08/2014 Document Reviewed: 07/30/2014  [employer] Interactive Patient Education ©2016 [employer]      Laparoscopic Cholecystectomy, Care After  These instructions give you information on caring for yourself after your procedure. Your doctor may also give you more specific instructions. Call your doctor if you have any problems or questions after your procedure.   HOME CARE  · Change your bandages (dressings) as told by your doctor.  · Keep the wound dry and clean. Wash the wound gently with soap and water. Pat the wound dry with a clean towel.  · Do not take baths, swim, or use hot tubs for 2 weeks, or as told by your doctor.  · Only take medicine as told by your doctor.  · Eat a normal diet as told by your doctor.  · Do not lift anything heavier than 10 pounds (4.5 kg) until your doctor says it is okay.  · Do not play contact sports for 1 week, or as told by your doctor.  GET HELP IF:  · Your wound is red, puffy (swollen), or painful.  · You have yellowish-white fluid (pus) coming from the wound.  · You have fluid draining from the wound for more than 1 day.  · You have a bad smell coming from the wound.  · Your wound breaks open.  GET HELP RIGHT AWAY IF:   · You have a rash.  · You have trouble breathing.  · You have chest  pain.  · You have a fever.  · You have pain in the shoulders (shoulder strap areas) that is getting worse.  · You feel dizzy or pass out (faint).  · You have severe belly (abdominal) pain.  · You feel sick to your stomach (nauseous) or throw up (vomit) for more than 1 day.     This information is not intended to replace advice given to you by your health care provider. Make sure you discuss any questions you have with your health care provider.     Document Released: 09/26/2009 Document Revised: 10/08/2014 Document Reviewed: 07/30/2014  [employer] Interactive Patient Education ©2016 [employer]          1. DIET: Upon discharge from the hospital you may resume your normal preoperative diet. Depending on how you are feeling and whether you have nausea or not, you may wish to stay with a bland diet for the first few days. However, you can advance this as quickly as you feel ready.     2. ACTIVITIES: After discharge from the hospital, you may resume full routine activities. However, there should be no heavy lifting (greater than 15 pounds) and no strenuous activities until after your follow-up visit. Otherwise, routine activities of daily living are acceptable.     3. DRIVING: You may drive whenever you are off pain medications and are able to perform the activities needed to drive, i.e. turning, bending, twisting, etc.     4. BATHING: You may get the wound wet at any time after leaving the hospital. You may shower, but do not submerge in a bath for at least a week. Dressings may come off after 48 hours.     5. BOWEL FUNCTION: A few patients, after this operation, will develop either frequent or loose stools after meals. This usually corrects itself after a few days, to a few weeks. If this occurs, do not worry; it is not unusual and will resolve. Much more common than loose stools, is constipation. The combination of pain medication and decreased activity level can cause constipation in otherwise normal patients. If  you feel this is occurring, take a laxative (Milk of Magnesia, Ex-Lax, Senokot, etc.) until the problem has resolved.     6. PAIN MEDICATION: You will be given a prescription for pain medication at discharge. Please take these as directed. It is important to remember not to take medications on an empty stomach as this may cause nausea. You may also take over the counter acetaminophen per package insert as needed for pain.     7.CALL IF YOU HAVE: (1) Fevers to more than 101 F, (2) Unusual chest or leg pain, (3) Drainage or fluid from incision that may be foul smelling, increased tenderness or soreness at the wound or the wound edges are no longer together, redness or swelling at the incision site. Please do not hesitate to call with any other questions.     8. APPOINTMENT: Contact our office for a follow-up appointment in 1 to 2 weeks following your procedure.     If you have any additional questions, please do not hesitate to call the office and speak to either myself or the physician on call.    Follow-up Information     1. Follow up with Rian Bearden M.D.. Schedule an appointment as soon as possible for a visit in 1 week.    Specialty:  Surgery    Contact information    [address]  [address]  [place] [address] [phone]          2. Follow up with AFSANEH Hunter.    Specialty:  Family Medicine    Contact information    [address] [facility] [id number] [phone]           Discharge Medication Instructions:    Below are the medications your physician expects you to take upon discharge:    Review all your home medications and newly ordered medications with your doctor and/or pharmacist. Follow medication instructions as directed by your doctor and/or pharmacist.    Please keep your medication list with you and share with your physician.               Medication List      START taking these medications        Instructions    Morning Afternoon Evening Bedtime    celecoxib 200 MG Caps   Last  time this was given:  200 mg on 8/10/2017  9:28 AM   Commonly known as:  CELEBREX        Take 1 Cap by mouth 2 Times a Day for 7 days.   Dose:  200 mg                          CONTINUE taking these medications        Instructions    Morning Afternoon Evening Bedtime    VIK-SELTZER PLUS COLD PO        Take 2 Tabs by mouth every bedtime.   Dose:  2 Tab                        alprazolam 1 MG Tabs   Commonly known as:  XANAX        Take 1 mg by mouth every bedtime.   Dose:  1 mg                        AMBIEN 10 MG Tabs   Generic drug:  zolpidem        Take 10 mg by mouth every bedtime. Indications: Trouble Sleeping   Dose:  10 mg                        amlodipine 10 MG Tabs   Commonly known as:  NORVASC        Take 10 mg by mouth every day.   Dose:  10 mg                        atenolol 25 MG Tabs   Last time this was given:  25 mg on 8/10/2017  9:28 AM   Commonly known as:  TENORMIN        Take 25 mg by mouth every day.   Dose:  25 mg                        benazepril 10 MG Tabs   Commonly known as:  LOTENSIN        Take 10 mg by mouth every day.   Dose:  10 mg                        glipiZIDE 5 MG Tabs   Commonly known as:  GLUCOTROL        Take 5 mg by mouth every evening.   Dose:  5 mg                        Krill Oil 1000 MG Caps        Take 1,000 mg by mouth every day.   Dose:  1000 mg                        levothyroxine 137 MCG Tabs   Last time this was given:  137 mcg on 8/10/2017 10:40 AM   Commonly known as:  SYNTHROID        Take 137 mcg by mouth Every morning on an empty stomach.   Dose:  137 mcg                        pioglitazone 30 MG Tabs   Commonly known as:  ACTOS        Take 30 mg by mouth every evening.   Dose:  30 mg                        Vitamin D3 2000 UNIT Caps        Take 2,000 Units by mouth every day.   Dose:  2000 Units                          ASK your doctor about these medications        Instructions    Morning Afternoon Evening Bedtime    aspirin EC 81 MG Tbec   Commonly known as:   ECOTRIN        Take 81 mg by mouth every day.   Dose:  81 mg                             Where to Get Your Medications      Information about where to get these medications is not yet available     ! Ask your nurse or doctor about these medications    - celecoxib 200 MG Caps            Instructions           Diet / Nutrition:    Follow any diet instructions given to you by your doctor or the dietician, including how much salt (sodium) you are allowed each day.    If you are overweight, talk to your doctor about a weight reduction plan.    Activity:    Remain physically active following your doctor's instructions about exercise and activity.    Rest often.     Any time you become even a little tired or short of breath, SIT DOWN and rest.    Worsening Symptoms:    Report any of the following signs and symptoms to the doctor's office immediately:    *Pain of jaw, arm, or neck  *Chest pain not relieved by medication                               *Dizziness or loss of consciousness  *Difficulty breathing even when at rest   *More tired than usual                                       *Bleeding drainage or swelling of surgical site  *Swelling of feet, ankles, legs or stomach                 *Fever (>100ºF)  *Pink or blood tinged sputum  *Weight gain (3lbs/day or 5lbs /week)           *Shock from internal defibrillator (if applicable)  *Palpitations or irregular heartbeats                *Cool and/or numb extremities    Stroke Awareness    Common Risk Factors for Stroke include:    Age  Atrial Fibrillation  Carotid Artery Stenosis  Diabetes Mellitus  Excessive alcohol consumption  High blood pressure  Overweight   Physical inactivity  Smoking    Warning signs and symptoms of a stroke include:    *Sudden numbness or weakness of the face, arm or leg (especially on one side of the body).  *Sudden confusion, trouble speaking or understanding.  *Sudden trouble seeing in one or both eyes.  *Sudden trouble walking, dizziness,  loss of balance or coordination.Sudden severe headache with no known cause.    It is very important to get treatment quickly when a stroke occurs. If you experience any of the above warning signs, call 911 immediately.                   Disclaimer         Quit Smoking / Tobacco Use:    I understand the use of any tobacco products increases my chance of suffering from future heart disease or stroke and could cause other illnesses which may shorten my life. Quitting the use of tobacco products is the single most important thing I can do to improve my health. For further information on smoking / tobacco cessation call a Toll Free Quit Line at 1-174.523.8612 (*[employer] [place]) or 1-307.520.5747 (American Lung Association) or you can access the web based program at www.lungNaphCare.org.    [place] Tobacco Users Help Line:  (513) 626-3808       Toll Free: 1-974.105.5979  Quit Tobacco Program [facility] (393)105-1170    Crisis Hotline:    [place] Crisis Hotline:  6-872-GXXXOBQ or 1-283.295.3404    [place] Crisis Hotline:    1-571.695.9459 or 410-330-8700    Discharge Survey:   Thank you for choosing [facility]. We hope we did everything we could to make your hospital stay a pleasant one. You may be receiving a phone survey and we would appreciate your time and participation in answering the questions. Your input is very valuable to us in our efforts to improve our service to our patients and their families.        My signature on this form indicates that:    1. I have reviewed and understand the above information.  2. My questions regarding this information have been answered to my satisfaction.  3. I have formulated a plan with my discharge nurse to obtain my prescribed medications for home.                  Disclaimer         __________________________________                     __________       ________                       Patient Signature                                                  Date                    Time

## 2017-08-09 NOTE — H&P
Surgery History and Physical  8/9/2017    Attending Physician: Rian Bearden MD.     CC: RUQ pain    HPI: This is a 71 y.o. male who presents to the Emergency Department as a transfer from [facility] where the patient was initially seen and evaluated for constant abdominal pain located to his right upper abdomen onset one day ago. He reports no exacerbating or alleviating factors. Patient denies associated nausea, vomiting, diarrhea, fever and chills. He has a history of appendectomy and hernia repair. He takes aspirin but denies using other blood thinners.      The patient states the pain started last night before dinner. For lunch he had a beef and cheese sandwich with fries.      Past Medical History   Diagnosis Date   • Diabetes      oral medication   • Hypertension    • Hiatus hernia syndrome    • Bronchitis 2001   • CATARACT      removed   • Pain 1/16/12     3/10 lower back   • Unspecified disorder of thyroid        Past Surgical History   Procedure Laterality Date   • Other  2008     right shoulder    • Lumbar laminectomy diskectomy  2/9/2012     Performed by GRETTA THORPE at [facility]       Current Facility-Administered Medications   Medication Dose Route Frequency Provider Last Rate Last Dose   • Respiratory Care per Protocol   Nebulization Continuous RT Rian Bearden M.D.       • Pharmacy Consult Request ...Pain Management Review 1 Each  1 Each Other PRN Rian Bearden M.D.       • docusate sodium (COLACE) capsule 100 mg  100 mg Oral BID Rian Bearden M.D.       • senna-docusate (PERICOLACE or SENOKOT S) 8.6-50 MG per tablet 1 Tab  1 Tab Oral Nightly Rian Bearden M.D.       • senna-docusate (PERICOLACE or SENOKOT S) 8.6-50 MG per tablet 1 Tab  1 Tab Oral Q24HRS PRN Rian Bearden M.D.       • polyethylene glycol/lytes (MIRALAX) PACKET 1 Packet  1 Packet Oral BID Rian Bearden M.D.       • magnesium hydroxide (MILK OF MAGNESIA) suspension 30  mL  30 mL Oral DAILY Rian Baerden M.D.       • bisacodyl (DULCOLAX) suppository 10 mg  10 mg Rectal Q24HRS PRN Rian Bearden M.D.       • fleet enema 133 mL  1 Each Rectal Once PRN Rian Bearden M.D.       • LR infusion   Intravenous Continuous Rian Bearden M.D. 125 mL/hr at 08/09/17 0822     • fentaNYL (SUBLIMAZE) injection 50 mcg  50 mcg Intravenous Q2HRS PRN Rian Bearden M.D.   50 mcg at 08/09/17 0830   • piperacillin-tazobactam (ZOSYN) 3.375 g in  mL IVPB  3.375 g Intravenous Q6HRS Rian Bearden M.D. 200 mL/hr at 08/09/17 0816 3.375 g at 08/09/17 0816   • famotidine (PEPCID) tablet 20 mg  20 mg Oral BID Rian Bearden M.D.        Or   • famotidine (PEPCID) injection 20 mg  20 mg Intravenous BID Rian Bearden M.D.       • ondansetron (ZOFRAN) syringe/vial injection 4 mg  4 mg Intravenous Q6HRS PRN Rian Bearden M.D.           Social History     Social History   • Marital Status:      Spouse Name: N/A   • Number of Children: N/A   • Years of Education: N/A     Occupational History   • Not on file.     Social History Main Topics   • Smoking status: Former Smoker -- 2.50 packs/day for 40 years     Types: Cigarettes     Quit date: 01/01/2006   • Smokeless tobacco: Current User     Types: Chew   • Alcohol Use: No   • Drug Use: No   • Sexual Activity: Not on file     Other Topics Concern   • Not on file     Social History Narrative       History reviewed. No pertinent family history.    Allergies:  Codeine; Hydrocodone; Oxycodone; Cymbalta; Statins; and Tramadol    Review of Systems:  Constitutional: Negative for fever, chills, weight loss, malaise/fatigue and diaphoresis.   HENT: Negative for hearing loss, ear pain, nosebleeds, congestion, sore throat, neck pain, and ear discharge.    Eyes: Negative for blurred vision, double vision, and redness.   Respiratory: Negative for cough, sputum production, shortness of breath, wheezing and stridor.  " Positive right upper quadrant subcostal pain.  Cardiovascular: Negative for chest pain, palpitations.   Gastrointestinal: Negative for heartburn, nausea, vomiting, diarrhea, constipation.   Genitourinary: Negative for dysuria, urgency, frequency.   Musculoskeletal: Negative for myalgias, back pain, joint pain and falls.   Skin: Negative for itching and rash.  Neurological: Negative for dizziness, loss of consciousness, weakness and headaches.   Endo/Heme/Allergies: Negative for environmental allergies. Does not bruise/bleed easily.   Psychiatric/Behavioral: Negative for depression and substance abuse. The patient is not nervous/anxious.    Physical Exam:  Blood pressure 135/77, pulse 91, temperature 36.4 °C (97.5 °F), resp. rate 18, height 1.905 m (6' 3\"), weight 132.2 kg (291 lb 7.2 oz), SpO2 94 %.    Constitutional: Awake, alert, oriented x3. No acute distress. GCS 15. E4 V5 M6.  Head: No cephalohematoma. Pupils are 3 mm,  reactive bilaterally. Midface stable. No malocclusion. Sclerae anicteric.   Neck: No tracheal deviation. No midline cervical spine tenderness. Full range of motion.  Cardiovascular: Normal rate, regular rhythm, normal heart sounds and intact distal pulses.  Exam reveals no gallop and no friction rub.  No murmur heard.  Pulmonary/Chest: Clavicles nontender to palpation. There is no chest wall tenderness bilaterally.  No crepitus. Positive breath sounds bilaterally.   Abdominal: Soft, nondistended. Tender to palpation in the right subcostal region. Pelvis is stable to anterior-posterior compression.  Musculoskeletal: Right upper extremity grossly atraumatic, palpable radial pulse. 5/5  strength. Full ROM and strength at elbow.  Left upper extremity grossly atraumatic, palpable radial pulse. 5/5  strength. Full ROM and strength at elbow.  Right lower extremity grossly atraumatic. 5/5 strength in ankle plantar flexion and dorsiflexion. No pain and full ROM at right knee and hip.   Left  " lower extremity grossly atraumatic. 5/5 strength in ankle plantar flexion and dorsiflexion. No pain and full ROM at left knee and hip.   Back: Midline thoracic and lumbar spines are nontender to palpation. No step-offs.   : Normal male external genitalia. Rectal exam not done.  Neurological: Sensation intact to light touch dorsum and plantar surfaces of both feet and the medial and lateral aspects of both lower legs.  Sensation intact to light touch dorsum and plantar surfaces of both hands.   Skin: Skin is warm and dry.  No diaphoresis. No erythema. No pallor.     Labs: from sending hospital essentially normal CBC. And was 43. AST 17 ALT 27 alk phos 56. BuSpar 20 mg 1.5 troponin I is negative lipase is normal amylase 43. Urinalysis is normal. CT abdomen revealed no significant abnormalities                    Radiology:  US-GALLBLADDER   Final Result      1.  Cholelithiasis with mild gallbladder distention and a positive sonographic Ott sign. This could indicate cholecystitis.   2.  RIGHT renal cysts      OUTSIDE IMAGES-CT ABDOMEN /PELVIS   Preliminary Result      OUTSIDE IMAGES-DX CHEST   Preliminary Result            Assessment: This is a 71 y.o. with a stone impacted in the gallbladder. He has a positive Ott sign.    Plan: Patient is nothing by mouth.  He has been given IV antibiotics.  Plan proceed to the OR for a laparoscopic cholecystectomy, possible open cholecystectomy.  These findings  Degenerative patient he understands. All questions were answered and wished proceed with surgery and  Is scheduled for later this afternoon.    Active Hospital Problems    Diagnosis   • Acute cholecystitis [K81.0]     Priority: High   • Obesity (BMI 35.0-39.9 without comorbidity) ([facility]) [E66.9]     Priority: Medium     BMI 35.92 kg/m2            Rian Bearden MD  [facility]  [phone]

## 2017-08-09 NOTE — IP AVS SNAPSHOT
8/10/2017    Corbin Schwarz  [address] [address]    Dear Corbin:    [facility] wants to ensure your discharge home is safe and you or your loved ones have had all of your questions answered regarding your care after you leave the hospital.    Below is a list of resources and contact information should you have any questions regarding your hospital stay, follow-up instructions, or active medical symptoms.    Questions or Concerns Regarding… Contact   Medical Questions Related to Your Discharge  (7 days a week, 8am-5pm) Contact a Nurse Care Coordinator   [phone]   Medical Questions Not Related to Your Discharge  (24 hours a day / 7 days a week)  Contact the Nurse Health Line   [phone]    Medications or Discharge Instructions Refer to your discharge packet   or contact your [facility] Primary Care Provider   [phone]   Follow-up Appointment(s) Schedule your appointment via [employer]   or contact Scheduling [phone]   Billing Review your statement via [employer]  or contact Billing [phone]   Medical Records Review your records via [employer]   or contact Medical Records [phone]     You may receive a telephone call within two days of discharge. This call is to make certain you understand your discharge instructions and have the opportunity to have any questions answered. You can also easily access your medical information, test results and upcoming appointments via the [employer] free online health management tool. You can learn more and sign up at [employer]Sphere. For assistance setting up your [employer] account, please call [phone].    Once again, we want to ensure your discharge home is safe and that you have a clear understanding of any next steps in your care. If you have any questions or concerns, please do not hesitate to contact us, we are here for you. Thank you for choosing [facility] for your healthcare needs.    Sincerely,    Your [facility] Healthcare Team

## 2017-08-09 NOTE — ED PROVIDER NOTES
ED Provider Note    Scribed for Mitra Ramachandran M.D. by Andie Luna. 8/9/2017  5:52 AM    Primary care provider: AFSANEH Hunter  Means of arrival: ambulance   History obtained from: patient   History limited by: none     CHIEF COMPLAINT  Chief Complaint   Patient presents with   • RUQ Pain     since 2030       HPI  Corbin Schwarz is a 71 y.o. male who presents to the Emergency Department as a transfer from [facility] where the patient was initially seen and evaluated for constant abdominal pain located to his right upper abdomen onset one day ago. He reports no exacerbating or alleviating factors. Patient denies associated nausea, vomiting, diarrhea, fever and chills. He has a history of appendectomy and hernia repair. He takes aspirin but denies using other blood thinners.       REVIEW OF SYSTEMS  GI: right upper abdominal pain. No nausea, vomiting or diarrhea.   See history of present illness. All other systems are negative. C.       PAST MEDICAL HISTORY   has a past medical history of Diabetes; Hypertension; Hiatus hernia syndrome; Bronchitis (2001); CATARACT; Pain (1/16/12); and Unspecified disorder of thyroid.      SURGICAL HISTORY   has past surgical history that includes other (2008) and lumbar laminectomy diskectomy (2/9/2012).      SOCIAL HISTORY  Social History   Substance Use Topics   • Smoking status: Former Smoker -- 2.50 packs/day for 40 years     Types: Cigarettes     Quit date: 01/01/2006   • Smokeless tobacco: Current User     Types: Chew   • Alcohol Use: No      History   Drug Use No       FAMILY HISTORY  History reviewed. No pertinent family history.    CURRENT MEDICATIONS  Home Medications     Reviewed by Cristian Torres (Pharmacy Tech) on 08/09/17 at 0750  Med List Status: Complete    Medication Last Dose Status    alprazolam (XANAX) 1 MG TABS 8/8/2017 Active    amlodipine (NORVASC) 10 MG Tab 8/8/2017 Active    aspirin EC (ECOTRIN) 81 MG Tablet Delayed Response 8/8/2017  "Active    atenolol (TENORMIN) 25 MG Tab 8/8/2017 Active    benazepril (LOTENSIN) 10 MG Tab 8/8/2017 Active    Chlorphen-Phenyleph-ASA (VIK-SELTZER PLUS COLD PO) 8/8/2017 Active    Cholecalciferol (VITAMIN D3) 2000 UNIT CAPS 8/8/2017 Active    glipiZIDE (GLUCOTROL) 5 MG TABS 8/8/2017 Active    Krill Oil 1000 MG CAPS 8/8/2017 Active    levothyroxine (SYNTHROID) 137 MCG Tab 8/8/2017 Active    pioglitazone (ACTOS) 30 MG Tab 8/8/2017 Active    zolpidem (AMBIEN) 10 MG TABS 8/8/2017 Active                ALLERGIES  Allergies   Allergen Reactions   • Codeine Itching   • Hydrocodone Itching     insomnia   • Oxycodone Itching     insomnia   • Cymbalta [Duloxetine Hcl]      irritable   • Statins [Hmg-Coa-R Inhibitors]      Dizzy   • Tramadol Itching     insomnia       PHYSICAL EXAM  VITAL SIGNS: /70 mmHg  Pulse 90  Temp(Src) 36.2 °C (97.2 °F)  Resp 20  Ht 1.93 m (6' 4\")  Wt 133.811 kg (295 lb)  BMI 35.92 kg/m2  SpO2 91%  Constitutional: Well developed, Well nourished, No acute distress, Non-toxic appearance.   HEENT: Normocephalic, Atraumatic,  external ears normal, pharynx pink,  Mucous  Membranes moist, No rhinorrhea or mucosal edema  Eyes: PERRL, EOMI, Conjunctiva normal, No discharge.   Neck: Normal range of motion, No tenderness, Supple, No stridor.   Lymphatic: No lymphadenopathy    Cardiovascular: Regular Rate and Rhythm, No murmurs,  rubs, or gallops.   Thorax & Lungs: Lungs clear to auscultation bilaterally, No respiratory distress, No wheezes, rhales or rhonchi, No chest wall tenderness.   Abdomen: Bowel sounds normal, Soft, tenderness to palpation of the right upper quadrant, non distended,  No pulsatile masses., no rebound guarding or peritoneal signs.   Skin: Warm, Dry, No erythema, No rash,   Back:  No CVA tenderness,  No spinal tenderness, bony crepitance, step offs, or instability.   Neurologic: Alert & oriented x 3, Normal motor function, Normal sensory function, No focal deficits noted. Normal " reflexes. Normal Cranial Nerves.  Extremities: Equal, intact distal pulses, No cyanosis, clubbing or edema,  No tenderness.   Musculoskeletal: Good range of motion in all major joints. No tenderness to palpation or major deformities noted.         DIAGNOSTIC STUDIES / PROCEDURES  EKG  12 lead EKG; Interpreted by emergency department physician  Rhythm: Normal Sinus Rhythm with one PVC noted.   Rate: 92  Axis: Normal  R Wave: Normal R wave progression  Normal ST-T segments  ST Segments: No ST segment elevation   T waves: Normal  Q waves: None  Clinical Impression: No acute changes      RADIOLOGY  US-GALLBLADDER   Final Result      1.  Cholelithiasis with mild gallbladder distention and a positive sonographic Ott sign. This could indicate cholecystitis.   2.  RIGHT renal cysts      OUTSIDE IMAGES-CT ABDOMEN /PELVIS   Preliminary Result      OUTSIDE IMAGES-DX CHEST   Preliminary Result      The radiologist's interpretation of all radiological studies have been reviewed by me.        COURSE & MEDICAL DECISION MAKING  Nursing notes, VS, PMSFHx reviewed in chart.    6:13 AM Obtained and reviewed past medical records from the transferring facility which indicated the following diagnostic results:  Lipase normal  Amylase 43   Urine normal   CT abdomen normal   CBC normal   BUN 23   Creatinine 1.5   AST 17   ALT 27   Alk phos 56   Troponin negative      5:52 AM - Patient seen and examined at bedside. Ordered US gallbladder to evaluate his symptoms.     6:17 AM Patient reevaluated at bedside. With repeat exam his right upper quadrant remains tender to palpation. He was updated on plan of care. Patient agrees to admission for cholecystectomy. He last ate at 9 last night and he understands he must be NPO.     7:18 AM Paged general surgery.     7:22 AM Consult with general surgery, Dr. Bearden, who agrees to see the patient for cholecystectomy.     7:25 AM Patient was updated on plan of care.       DISPOSITION:  Patient will be  admitted to Dr. Bearden in guarded condition.      FINAL IMPRESSION  1. Calculus of gallbladder with acute cholecystitis without obstruction         Andie WILD (Trav), am scribing for, and in the presence of, Mitra Ramachandran M.D..  Electronically signed by: Andie Luna (Trav), 8/9/2017  Mitra WILD M.D. personally performed the services described in this documentation, as scribed by Andie Luna in my presence, and it is both accurate and complete.    The note accurately reflects work and decisions made by me.  Mitra Ramachandran  8/9/2017  8:49 AM

## 2017-08-09 NOTE — PROGRESS NOTES
2 nurse skin assessment performed, skin is intact with the exception of psoriasis on his buttocks and bruising on the right and left forearms.

## 2017-08-09 NOTE — ED NOTES
Corbin Schwarz    Chief Complaint   Patient presents with   • RUQ Pain     since 2030       Pt BIBA transfer from [facility].  Pt c/o Ab pain since 2030 last night.  Per EMS, CT of ab were negative.  Pt denies N/V/D.  Tenderness noted on RUQ.  PMH: DM, hypothyroidism

## 2017-08-10 VITALS
HEIGHT: 75 IN | BODY MASS INDEX: 36.24 KG/M2 | DIASTOLIC BLOOD PRESSURE: 77 MMHG | WEIGHT: 291.45 LBS | TEMPERATURE: 97.9 F | OXYGEN SATURATION: 92 % | HEART RATE: 74 BPM | RESPIRATION RATE: 18 BRPM | SYSTOLIC BLOOD PRESSURE: 127 MMHG

## 2017-08-10 LAB
ALBUMIN SERPL BCP-MCNC: 3.6 G/DL (ref 3.2–4.9)
ALBUMIN/GLOB SERPL: 1.3 G/DL
ALP SERPL-CCNC: 40 U/L (ref 30–99)
ALT SERPL-CCNC: 37 U/L (ref 2–50)
ANION GAP SERPL CALC-SCNC: 10 MMOL/L (ref 0–11.9)
AST SERPL-CCNC: 41 U/L (ref 12–45)
BASOPHILS # BLD AUTO: 0.2 % (ref 0–1.8)
BASOPHILS # BLD: 0.02 K/UL (ref 0–0.12)
BILIRUB SERPL-MCNC: 0.9 MG/DL (ref 0.1–1.5)
BUN SERPL-MCNC: 15 MG/DL (ref 8–22)
CALCIUM SERPL-MCNC: 8.9 MG/DL (ref 8.5–10.5)
CHLORIDE SERPL-SCNC: 104 MMOL/L (ref 96–112)
CO2 SERPL-SCNC: 25 MMOL/L (ref 20–33)
CREAT SERPL-MCNC: 1.34 MG/DL (ref 0.5–1.4)
EKG IMPRESSION: NORMAL
EOSINOPHIL # BLD AUTO: 0 K/UL (ref 0–0.51)
EOSINOPHIL NFR BLD: 0 % (ref 0–6.9)
ERYTHROCYTE [DISTWIDTH] IN BLOOD BY AUTOMATED COUNT: 47.9 FL (ref 35.9–50)
GFR SERPL CREATININE-BSD FRML MDRD: 52 ML/MIN/1.73 M 2
GLOBULIN SER CALC-MCNC: 2.8 G/DL (ref 1.9–3.5)
GLUCOSE SERPL-MCNC: 202 MG/DL (ref 65–99)
HCT VFR BLD AUTO: 49.4 % (ref 42–52)
HGB BLD-MCNC: 16.6 G/DL (ref 14–18)
IMM GRANULOCYTES # BLD AUTO: 0.02 K/UL (ref 0–0.11)
IMM GRANULOCYTES NFR BLD AUTO: 0.2 % (ref 0–0.9)
LYMPHOCYTES # BLD AUTO: 0.49 K/UL (ref 1–4.8)
LYMPHOCYTES NFR BLD: 5.9 % (ref 22–41)
MAGNESIUM SERPL-MCNC: 1.9 MG/DL (ref 1.5–2.5)
MCH RBC QN AUTO: 30.9 PG (ref 27–33)
MCHC RBC AUTO-ENTMCNC: 33.6 G/DL (ref 33.7–35.3)
MCV RBC AUTO: 91.8 FL (ref 81.4–97.8)
MONOCYTES # BLD AUTO: 0.36 K/UL (ref 0–0.85)
MONOCYTES NFR BLD AUTO: 4.3 % (ref 0–13.4)
NEUTROPHILS # BLD AUTO: 7.39 K/UL (ref 1.82–7.42)
NEUTROPHILS NFR BLD: 89.4 % (ref 44–72)
NRBC # BLD AUTO: 0 K/UL
NRBC BLD AUTO-RTO: 0 /100 WBC
PHOSPHATE SERPL-MCNC: 2.6 MG/DL (ref 2.5–4.5)
PLATELET # BLD AUTO: 135 K/UL (ref 164–446)
PMV BLD AUTO: 10.5 FL (ref 9–12.9)
POTASSIUM SERPL-SCNC: 4.7 MMOL/L (ref 3.6–5.5)
PROT SERPL-MCNC: 6.4 G/DL (ref 6–8.2)
RBC # BLD AUTO: 5.38 M/UL (ref 4.7–6.1)
SODIUM SERPL-SCNC: 139 MMOL/L (ref 135–145)
WBC # BLD AUTO: 8.3 K/UL (ref 4.8–10.8)

## 2017-08-10 PROCEDURE — 700111 HCHG RX REV CODE 636 W/ 250 OVERRIDE (IP): Performed by: SURGERY

## 2017-08-10 PROCEDURE — 36415 COLL VENOUS BLD VENIPUNCTURE: CPT

## 2017-08-10 PROCEDURE — A9270 NON-COVERED ITEM OR SERVICE: HCPCS | Performed by: NURSE PRACTITIONER

## 2017-08-10 PROCEDURE — 700102 HCHG RX REV CODE 250 W/ 637 OVERRIDE(OP): Performed by: NURSE PRACTITIONER

## 2017-08-10 PROCEDURE — 700102 HCHG RX REV CODE 250 W/ 637 OVERRIDE(OP): Performed by: SURGERY

## 2017-08-10 PROCEDURE — 85025 COMPLETE CBC W/AUTO DIFF WBC: CPT

## 2017-08-10 PROCEDURE — G0378 HOSPITAL OBSERVATION PER HR: HCPCS

## 2017-08-10 PROCEDURE — A9270 NON-COVERED ITEM OR SERVICE: HCPCS | Performed by: SURGERY

## 2017-08-10 PROCEDURE — 94760 N-INVAS EAR/PLS OXIMETRY 1: CPT

## 2017-08-10 PROCEDURE — 700105 HCHG RX REV CODE 258: Performed by: SURGERY

## 2017-08-10 PROCEDURE — 80053 COMPREHEN METABOLIC PANEL: CPT

## 2017-08-10 PROCEDURE — 83735 ASSAY OF MAGNESIUM: CPT

## 2017-08-10 PROCEDURE — 84100 ASSAY OF PHOSPHORUS: CPT

## 2017-08-10 PROCEDURE — 96366 THER/PROPH/DIAG IV INF ADDON: CPT

## 2017-08-10 RX ORDER — ATENOLOL 50 MG/1
25 TABLET ORAL DAILY
Status: DISCONTINUED | OUTPATIENT
Start: 2017-08-10 | End: 2017-08-10 | Stop reason: HOSPADM

## 2017-08-10 RX ORDER — BENAZEPRIL HYDROCHLORIDE 20 MG/1
10 TABLET ORAL DAILY
Status: DISCONTINUED | OUTPATIENT
Start: 2017-08-11 | End: 2017-08-10 | Stop reason: HOSPADM

## 2017-08-10 RX ORDER — CELECOXIB 200 MG/1
200 CAPSULE ORAL 2 TIMES DAILY
Qty: 14 CAP | Refills: 0 | Status: SHIPPED | OUTPATIENT
Start: 2017-08-10 | End: 2017-08-17

## 2017-08-10 RX ORDER — LEVOTHYROXINE SODIUM 137 UG/1
137 TABLET ORAL
Status: DISCONTINUED | OUTPATIENT
Start: 2017-08-10 | End: 2017-08-10 | Stop reason: HOSPADM

## 2017-08-10 RX ORDER — PIOGLITAZONEHYDROCHLORIDE 30 MG/1
30 TABLET ORAL EVERY EVENING
Status: DISCONTINUED | OUTPATIENT
Start: 2017-08-10 | End: 2017-08-10 | Stop reason: HOSPADM

## 2017-08-10 RX ORDER — AMLODIPINE BESYLATE 10 MG/1
10 TABLET ORAL DAILY
Status: DISCONTINUED | OUTPATIENT
Start: 2017-08-11 | End: 2017-08-10 | Stop reason: HOSPADM

## 2017-08-10 RX ORDER — GLIPIZIDE 5 MG/1
5 TABLET ORAL EVERY EVENING
Status: DISCONTINUED | OUTPATIENT
Start: 2017-08-10 | End: 2017-08-10 | Stop reason: HOSPADM

## 2017-08-10 RX ADMIN — PIPERACILLIN SODIUM AND TAZOBACTAM SODIUM 3.38 G: 3; .375 INJECTION, POWDER, FOR SOLUTION INTRAVENOUS at 05:16

## 2017-08-10 RX ADMIN — CELECOXIB 200 MG: 200 CAPSULE ORAL at 09:28

## 2017-08-10 RX ADMIN — LEVOTHYROXINE SODIUM 137 MCG: 137 TABLET ORAL at 10:40

## 2017-08-10 RX ADMIN — ATENOLOL 25 MG: 50 TABLET ORAL at 09:28

## 2017-08-10 RX ADMIN — PIPERACILLIN SODIUM AND TAZOBACTAM SODIUM 3.38 G: 3; .375 INJECTION, POWDER, FOR SOLUTION INTRAVENOUS at 00:09

## 2017-08-10 ASSESSMENT — COPD QUESTIONNAIRES
COPD SCREENING SCORE: 4
DURING THE PAST 4 WEEKS HOW MUCH DID YOU FEEL SHORT OF BREATH: NONE/LITTLE OF THE TIME
HAVE YOU SMOKED AT LEAST 100 CIGARETTES IN YOUR ENTIRE LIFE: YES
DO YOU EVER COUGH UP ANY MUCUS OR PHLEGM?: NO/ONLY WITH OCCASIONAL COLDS OR INFECTIONS

## 2017-08-10 ASSESSMENT — ENCOUNTER SYMPTOMS
RESPIRATORY NEGATIVE: 1
SPEECH CHANGE: 0
ABDOMINAL PAIN: 1
CARDIOVASCULAR NEGATIVE: 1
CONSTITUTIONAL NEGATIVE: 1

## 2017-08-10 ASSESSMENT — PAIN SCALES - GENERAL
PAINLEVEL_OUTOF10: 2

## 2017-08-10 ASSESSMENT — LIFESTYLE VARIABLES: EVER_SMOKED: YES

## 2017-08-10 NOTE — PROGRESS NOTES
Received report from night RN.  Patient is complaining of very minimal pain, rating at a 2/10.  He denies intervention.  He is alert and oriented.  Patient has 4 surgical incisions o the abdomen, covered with gauze and transparent dressings.  Dressings are clean, dry and intact.  He denies any nausea or vomiting, has not yet had a bowel movement today.  Patient is up to the bathroom with no assistance needed, but educated on use of call light prior to getting out of bed.  All patient's needs at this time are met.  Bed is in lowest position and call light is within reach.

## 2017-08-10 NOTE — OR SURGEON
Operative Report    PreOp Diagnosis: Acute cholecystitis    PostOp Diagnosis: same    Procedure(s):  WERNER BY LAPAROSCOPY - Wound Class: contaminated    Surgeon(s):  Rian Bearden M.D.    Anesthesiologist/Type of Anesthesia:  Anesthesiologist: Isabell Romero M.D./General    Surgical Staff:  Circulator: Flora Conde R.N.  Relief Circulator: Kimi Ramos R.N.  Scrub Person: MARIBEL Perez IV  First Assist: KAREN Jeronimo    Specimens:  * No specimens in log *    Estimated Blood Loss: ~ 200 cc    Findings: Omental adhesions to gallbladder / swollen and edematous     Complications: none        8/9/2017 5:13 PM Rian Bearden

## 2017-08-10 NOTE — PROGRESS NOTES
Patient discharged with instructions, all questions answered and prescriptions provided.  He ambulated off of the unit with his wife.

## 2017-08-10 NOTE — DISCHARGE INSTRUCTIONS
Discharge Instructions    Discharged to home by car with relative. Discharged via wheelchair, hospital escort: Yes.  Special equipment needed: Not Applicable    Be sure to schedule a follow-up appointment with your primary care doctor or any specialists as instructed.     Discharge Plan:   Influenza Vaccine Indication: Not indicated: Previously immunized this influenza season and > 8 years of age    I understand that a diet low in cholesterol, fat, and sodium is recommended for good health. Unless I have been given specific instructions below for another diet, I accept this instruction as my diet prescription.   Other diet: regular low fat diet    Special Instructions: None    · Is patient discharged on Warfarin / Coumadin?   No     · Is patient Post Blood Transfusion?  No    Depression / Suicide Risk    As you are discharged from this [facility] facility, it is important to learn how to keep safe from harming yourself.    Recognize the warning signs:  · Abrupt changes in personality, positive or negative- including increase in energy   · Giving away possessions  · Change in eating patterns- significant weight changes-  positive or negative  · Change in sleeping patterns- unable to sleep or sleeping all the time   · Unwillingness or inability to communicate  · Depression  · Unusual sadness, discouragement and loneliness  · Talk of wanting to die  · Neglect of personal appearance   · Rebelliousness- reckless behavior  · Withdrawal from people/activities they love  · Confusion- inability to concentrate     If you or a loved one observes any of these behaviors or has concerns about self-harm, here's what you can do:  · Talk about it- your feelings and reasons for harming yourself  · Remove any means that you might use to hurt yourself (examples: pills, rope, extension cords, firearm)  · Get professional help from the community (Mental Health, Substance Abuse, psychological counseling)  · Do not be alone:Call your  Safe Contact- someone whom you trust who will be there for you.  · Call your local CRISIS HOTLINE 640-5607 or 909-185-4469  · Call your local Children's Mobile Crisis Response Team [place] (024) 657-1844 or www.[employer] [place]  · Call the toll free National Suicide Prevention Hotlines   · National Suicide Prevention Lifeline 501-036-CXOO (7661)  · [employer] Line Network 800-SUICIDE (044-6994)    Laparoscopic Cholecystectomy  Laparoscopic cholecystectomy is surgery to remove the gallbladder. The gallbladder is located in the upper right part of the abdomen, behind the liver. It is a storage sac for bile produced in the liver. Bile aids in the digestion and absorption of fats. Cholecystectomy is often done for inflammation of the gallbladder (cholecystitis). This condition is usually caused by a buildup of gallstones (cholelithiasis) in your gallbladder. Gallstones can block the flow of bile, resulting in inflammation and pain. In severe cases, emergency surgery may be required. When emergency surgery is not required, you will have time to prepare for the procedure.  Laparoscopic surgery is an alternative to open surgery. Laparoscopic surgery has a shorter recovery time. Your common bile duct may also need to be examined during the procedure. If stones are found in the common bile duct, they may be removed.  LET YOUR HEALTH CARE PROVIDER KNOW ABOUT:  · Any allergies you have.  · All medicines you are taking, including vitamins, herbs, eye drops, creams, and over-the-counter medicines.  · Previous problems you or members of your family have had with the use of anesthetics.  · Any blood disorders you have.  · Previous surgeries you have had.  · Medical conditions you have.  RISKS AND COMPLICATIONS  Generally, this is a safe procedure. However, as with any procedure, complications can occur. Possible complications include:  · Infection.  · Damage to the common bile duct, nerves, arteries, veins, or other  internal organs such as the stomach, liver, or intestines.  · Bleeding.  · A stone may remain in the common bile duct.  · A bile leak from the cyst duct that is clipped when your gallbladder is removed.  · The need to convert to open surgery, which requires a larger incision in the abdomen. This may be necessary if your surgeon thinks it is not safe to continue with a laparoscopic procedure.  BEFORE THE PROCEDURE  · Ask your health care provider about changing or stopping any regular medicines. You will need to stop taking aspirin or blood thinners at least 5 days prior to surgery.  · Do not eat or drink anything after midnight the night before surgery.  · Let your health care provider know if you develop a cold or other infectious problem before surgery.  PROCEDURE   · You will be given medicine to make you sleep through the procedure (general anesthetic). A breathing tube will be placed in your mouth.  · When you are asleep, your surgeon will make several small cuts (incisions) in your abdomen.  · A thin, lighted tube with a tiny camera on the end (laparoscope) is inserted through one of the small incisions. The camera on the laparoscope sends a picture to a TV screen in the operating room. This gives the surgeon a good view inside your abdomen.  · A gas will be pumped into your abdomen. This expands your abdomen so that the surgeon has more room to perform the surgery.  · Other tools needed for the procedure are inserted through the other incisions. The gallbladder is removed through one of the incisions.  · After the removal of your gallbladder, the incisions will be closed with stitches, staples, or skin glue.  AFTER THE PROCEDURE  · You will be taken to a recovery area where your progress will be checked often.  · You may be allowed to go home the same day if your pain is controlled and you can tolerate liquids.     This information is not intended to replace advice given to you by your health care provider.  Make sure you discuss any questions you have with your health care provider.     Document Released: 12/18/2006 Document Revised: 10/08/2014 Document Reviewed: 07/30/2014  [employer] Patient Education ©2016 [employer]      Laparoscopic Cholecystectomy, Care After  These instructions give you information on caring for yourself after your procedure. Your doctor may also give you more specific instructions. Call your doctor if you have any problems or questions after your procedure.   HOME CARE  · Change your bandages (dressings) as told by your doctor.  · Keep the wound dry and clean. Wash the wound gently with soap and water. Pat the wound dry with a clean towel.  · Do not take baths, swim, or use hot tubs for 2 weeks, or as told by your doctor.  · Only take medicine as told by your doctor.  · Eat a normal diet as told by your doctor.  · Do not lift anything heavier than 10 pounds (4.5 kg) until your doctor says it is okay.  · Do not play contact sports for 1 week, or as told by your doctor.  GET HELP IF:  · Your wound is red, puffy (swollen), or painful.  · You have yellowish-white fluid (pus) coming from the wound.  · You have fluid draining from the wound for more than 1 day.  · You have a bad smell coming from the wound.  · Your wound breaks open.  GET HELP RIGHT AWAY IF:   · You have a rash.  · You have trouble breathing.  · You have chest pain.  · You have a fever.  · You have pain in the shoulders (shoulder strap areas) that is getting worse.  · You feel dizzy or pass out (faint).  · You have severe belly (abdominal) pain.  · You feel sick to your stomach (nauseous) or throw up (vomit) for more than 1 day.     This information is not intended to replace advice given to you by your health care provider. Make sure you discuss any questions you have with your health care provider.     Document Released: 09/26/2009 Document Revised: 10/08/2014 Document Reviewed: 07/30/2014  [employer] Patient  Education ©2016 [employer]          1. DIET: Upon discharge from the hospital you may resume your normal preoperative diet. Depending on how you are feeling and whether you have nausea or not, you may wish to stay with a bland diet for the first few days. However, you can advance this as quickly as you feel ready.     2. ACTIVITIES: After discharge from the hospital, you may resume full routine activities. However, there should be no heavy lifting (greater than 15 pounds) and no strenuous activities until after your follow-up visit. Otherwise, routine activities of daily living are acceptable.     3. DRIVING: You may drive whenever you are off pain medications and are able to perform the activities needed to drive, i.e. turning, bending, twisting, etc.     4. BATHING: You may get the wound wet at any time after leaving the hospital. You may shower, but do not submerge in a bath for at least a week. Dressings may come off after 48 hours.     5. BOWEL FUNCTION: A few patients, after this operation, will develop either frequent or loose stools after meals. This usually corrects itself after a few days, to a few weeks. If this occurs, do not worry; it is not unusual and will resolve. Much more common than loose stools, is constipation. The combination of pain medication and decreased activity level can cause constipation in otherwise normal patients. If you feel this is occurring, take a laxative (Milk of Magnesia, Ex-Lax, Senokot, etc.) until the problem has resolved.     6. PAIN MEDICATION: You will be given a prescription for pain medication at discharge. Please take these as directed. It is important to remember not to take medications on an empty stomach as this may cause nausea. You may also take over the counter acetaminophen per package insert as needed for pain.     7.CALL IF YOU HAVE: (1) Fevers to more than 101 F, (2) Unusual chest or leg pain, (3) Drainage or fluid from incision that may be foul smelling,  increased tenderness or soreness at the wound or the wound edges are no longer together, redness or swelling at the incision site. Please do not hesitate to call with any other questions.     8. APPOINTMENT: Contact our office for a follow-up appointment in 1 to 2 weeks following your procedure.     If you have any additional questions, please do not hesitate to call the office and speak to either myself or the physician on call.

## 2017-08-10 NOTE — CARE PLAN
Problem: Safety  Goal: Will remain free from injury  Outcome: PROGRESSING AS EXPECTED  Educated about calling for assistance, call light within reach, nonslip socks, pt refused bed alarm    Problem: Knowledge Deficit  Goal: Knowledge of disease process/condition, treatment plan, diagnostic tests, and medications will improve  Outcome: PROGRESSING AS EXPECTED  Discussed POC with pt    Problem: Pain Management  Goal: Pain level will decrease to patient’s comfort goal  Outcome: PROGRESSING AS EXPECTED  Educated about availability of PRN medications, Ice pack to ABD

## 2017-08-10 NOTE — PROGRESS NOTES
"  Trauma/Surgical Progress Note    Author: Letitia Castillo Date & Time created: 8/10/2017   9:16 AM     Interval Events:    POD # 1 laparoscopic cholecystectomy  Feeling much better, adequate pain control, tolerating diet, (+) flatus  Lap site dressings intact, good bowel sounds, abdomen soft  Disposition home    Review of Systems   Constitutional: Negative.    HENT: Negative.    Respiratory: Negative.    Cardiovascular: Negative.    Gastrointestinal: Positive for abdominal pain (Mild).   Genitourinary: Negative.         Voiding   Skin: Negative.    Neurological: Negative for speech change.     Hemodynamics:  Blood pressure 127/77, pulse 74, temperature 36.6 °C (97.9 °F), resp. rate 18, height 1.905 m (6' 3\"), weight 132.2 kg (291 lb 7.2 oz), SpO2 92 %.     Respiratory:    Respiration: 18, Pulse Oximetry: 92 %, O2 Daily Delivery Respiratory : Silicone Nasal Cannula     Work Of Breathing / Effort: Mild  RUL Breath Sounds: Clear, RML Breath Sounds: Clear, RLL Breath Sounds: Clear, GLYNN Breath Sounds: Clear, LLL Breath Sounds: Clear  Fluids:    Intake/Output Summary (Last 24 hours) at 08/10/17 0916  Last data filed at 08/10/17 0400   Gross per 24 hour   Intake   3335 ml   Output    400 ml   Net   2935 ml     Admit Weight: (!) 133.811 kg (295 lb)  Current Weight: (!) 132.2 kg (291 lb 7.2 oz)    Physical Exam   Constitutional: He is oriented to person, place, and time. He appears well-developed. No distress.   Obese   Eyes: Conjunctivae are normal.   Neck: Neck supple. No JVD present. No tracheal deviation present.   Cardiovascular: Normal rate.    Pulmonary/Chest: Effort normal. No respiratory distress.   Abdominal: Soft. Bowel sounds are normal. He exhibits no distension. There is tenderness (Mild). There is no guarding.   Lap site dressings intact   Neurological: He is alert and oriented to person, place, and time.   Skin: Skin is warm and dry.   Psychiatric: He has a normal mood and affect. His behavior is normal. "   Nursing note and vitals reviewed.      Medical Decision Making/Problem List:    Active Hospital Problems    Diagnosis   • Acute cholecystitis [K81.0]     Priority: High     Cholelithiasis with mild gallbladder distention and a positive sonographic Ott sign.   8/9 - Lap chantale.     • Obesity (BMI 35.0-39.9 without comorbidity) ([facility]) [E66.9]     Priority: Medium     BMI 35.92 kg/m2        Core Measures & Quality Metrics:  Labs reviewed, Medications reviewed and Radiology images reviewed  Aden catheter: No Aden      DVT Prophylaxis: Not indicated at this time, ambulatory  DVT prophylaxis - mechanical: Not indicated at this time, ambulatory  Ulcer prophylaxis: Not indicated    Assessed for rehab: Patient returned to prior level of function, rehabilitation not indicated at this time    EYAL Score     Discussed patient condition with RN, Patient and general surgery. Dr. ROS Bearden    Patient seen, data reviewed and discussed.  Agree with assessment and plan.  MELANIA

## 2017-08-10 NOTE — OP REPORT
DATE OF OPERATION: 8/9/2017    PREOPERATIVE DIAGNOSIS: Acute cholecystitis    POSTOPERATIVE DIAGNOSIS: Same    PROCEDURE PERFORMED: Laparoscopic cholecystectomy    SURGEON: Rian Bearden MD    ASSISTANT: First Assist: KAREN Jeronimo    ANESTHESIOLOGIST:  Isabell Romero MD    ANESTHESIA: GETA / Local 30 cc 0.5% maracaine with epi    INDICATIONS: The patient is a 71 y.o. male with a history of RUQ pain. He is taken to the operating room for laparoscopic cholecystectomy .     FINDINGS:  Omental adhesions to gallbladder / swollen and edematous  .     SPECIMEN:  gallbldder    ESTIMATED BLOOD LOSS: 200 mL    PROCEDURE: With the patient in supine position, general anesthesia   was administered. Abdomen was prepped with ChloraPrep and widely draped.   Local anesthesia infiltrated above the umbilicus, transverse incision made,   blunt dissection used to the level of fascia. Fascia was cleared. A single   pass made with 11 blade. Clamp was inserted and spread opening remaining   fascia and the peritoneum. 0 Vicryl sutures placed in a figure-of-eight   fashion. A 5 mm port was introduced and the abdomen was insufflated. A   10 mm port was placed in the epigastric position just to thr right of the falciform   ligament after instillation of local anesthesia, done under direct vision.   Two 5 mm ports were inserted in the right upper quadrant in similar fashion.   The gallbladder was easily identified.  Grasper was then used to grasp it at the   dome. Another grasper was used at the neck. The adhesions were dense and were taken   down with cautery and blunt dissection. The gallbladder was scored in medial and lateral   sides to increase mobility. Blunt dissection then commenced in the triangle,    staying at the edge of the gallbladder. I was able to clear things so only 2 structures   remained in this area.  Two clips were placed proximal and one distal on the cystic duct and   then cut. The cystic artery  had two clips proximal and one distal and then cut. Gallbladder was   then taken down from the liver bed with cautery. It was freed,   placed in an EndoCatch and brought out through the epigastric port site. The port   was replaced. The bed of the gallbladder was reinspected.   There was no evidence of bleeding. Right upper quadrant fluid was evacuated,   irrigated and evacuated. The clips were checked distally. There was no   evidence of bleeding from the bed of the gallbladder or region of the clips.   There was no bile leak. Fluid in the upper abdomen was evacuated.   Endo Close device was used at the epigastric port site after it was removed to   reapproximate the fascia with a 2-0 vicryl suture. The 5 mm ports were removed. There was no   evidence of bleeding from the port sites. The umbilical port was removed.   The abdomen was compressed to evacuate all remaining gas. Fascia in umbilical   port site was reapproximated with previously placed Vicryl suture and   checked. There was no evidence of any defect. Port sites were all irrigated. Skin   in all port sites reapproximated 4-0 Vicryl subcuticular suture. Areas were cleaned   and dried. Benzoin and Steri-Strips were applied followed by Tegaderm. Patient tolerated procedure well   and was extubated in the OR, brought to recovery room. Plan is to admit to the floor.      ____________________________________     KY TORRES MD    DT: 8/9/2017  5:15 PM

## 2017-08-10 NOTE — PROGRESS NOTES
Pt AAOx4, pleasant and cooperative.  Lungs are clear, diminish on bases 2L NC in place.  4 lap sties on abd, dressing CDI. Old drainage marked and noted.  Hypoactive bowel sounds, - flatus. +voiding.  Denies N/V, tolerating clear liquid diet well, will AAT.  Pain at 4/10, tolerable level at this time.  Up with SBA, steady gait.  Call light and personal belongings within reach.  All needs met at this time. Will continue to monitor pt.

## 2017-08-10 NOTE — PROGRESS NOTES
Patient back from the PACU, he is alert and oriented, describing pain of 4/10.  The patient has 4 abdominal incisions, all covered with gauze, clean, dry and intact.  He is breathing well on 2 liters via nasal canula.

## 2017-08-10 NOTE — CARE PLAN
Problem: Safety  Goal: Will remain free from injury  Outcome: PROGRESSING AS EXPECTED  Educated on use of call light prior to getting out of bed.    Problem: Pain Management  Goal: Pain level will decrease to patient’s comfort goal  Outcome: PROGRESSING AS EXPECTED  Medicated per MAR prn

## 2018-09-15 ENCOUNTER — APPOINTMENT (OUTPATIENT)
Dept: RADIOLOGY | Facility: MEDICAL CENTER | Age: 73
End: 2018-09-15
Attending: EMERGENCY MEDICINE
Payer: MEDICARE

## 2018-09-15 ENCOUNTER — HOSPITAL ENCOUNTER (OUTPATIENT)
Dept: RADIOLOGY | Facility: MEDICAL CENTER | Age: 73
End: 2018-09-15

## 2018-09-15 ENCOUNTER — HOSPITAL ENCOUNTER (OUTPATIENT)
Facility: MEDICAL CENTER | Age: 73
End: 2018-09-16
Attending: EMERGENCY MEDICINE | Admitting: INTERNAL MEDICINE
Payer: MEDICARE

## 2018-09-15 ENCOUNTER — APPOINTMENT (OUTPATIENT)
Dept: RADIOLOGY | Facility: MEDICAL CENTER | Age: 73
End: 2018-09-15
Attending: INTERNAL MEDICINE
Payer: MEDICARE

## 2018-09-15 DIAGNOSIS — R27.0 ATAXIA: ICD-10-CM

## 2018-09-15 DIAGNOSIS — E03.9 HYPOTHYROIDISM, UNSPECIFIED TYPE: ICD-10-CM

## 2018-09-15 DIAGNOSIS — E11.8 TYPE 2 DIABETES MELLITUS WITH COMPLICATION, WITHOUT LONG-TERM CURRENT USE OF INSULIN (HCC): ICD-10-CM

## 2018-09-15 DIAGNOSIS — E78.5 HYPERLIPIDEMIA, UNSPECIFIED HYPERLIPIDEMIA TYPE: ICD-10-CM

## 2018-09-15 PROBLEM — R25.1 TREMOR: Status: ACTIVE | Noted: 2018-09-15

## 2018-09-15 LAB
ALBUMIN SERPL BCP-MCNC: 3.9 G/DL (ref 3.2–4.9)
ALBUMIN/GLOB SERPL: 1.7 G/DL
ALP SERPL-CCNC: 44 U/L (ref 30–99)
ALT SERPL-CCNC: 20 U/L (ref 2–50)
ANION GAP SERPL CALC-SCNC: 10 MMOL/L (ref 0–11.9)
APTT PPP: 26.2 SEC (ref 24.7–36)
AST SERPL-CCNC: 15 U/L (ref 12–45)
BASOPHILS # BLD AUTO: 0.4 % (ref 0–1.8)
BASOPHILS # BLD: 0.02 K/UL (ref 0–0.12)
BILIRUB SERPL-MCNC: 0.7 MG/DL (ref 0.1–1.5)
BUN SERPL-MCNC: 13 MG/DL (ref 8–22)
CALCIUM SERPL-MCNC: 8.4 MG/DL (ref 8.5–10.5)
CHLORIDE SERPL-SCNC: 108 MMOL/L (ref 96–112)
CO2 SERPL-SCNC: 23 MMOL/L (ref 20–33)
CREAT SERPL-MCNC: 1.02 MG/DL (ref 0.5–1.4)
EOSINOPHIL # BLD AUTO: 0.05 K/UL (ref 0–0.51)
EOSINOPHIL NFR BLD: 1.1 % (ref 0–6.9)
ERYTHROCYTE [DISTWIDTH] IN BLOOD BY AUTOMATED COUNT: 45.4 FL (ref 35.9–50)
EST. AVERAGE GLUCOSE BLD GHB EST-MCNC: 134 MG/DL
GLOBULIN SER CALC-MCNC: 2.3 G/DL (ref 1.9–3.5)
GLUCOSE SERPL-MCNC: 106 MG/DL (ref 65–99)
HBA1C MFR BLD: 6.3 % (ref 0–5.6)
HCT VFR BLD AUTO: 47.3 % (ref 42–52)
HGB BLD-MCNC: 16.1 G/DL (ref 14–18)
IMM GRANULOCYTES # BLD AUTO: 0.02 K/UL (ref 0–0.11)
IMM GRANULOCYTES NFR BLD AUTO: 0.4 % (ref 0–0.9)
INR PPP: 0.96 (ref 0.87–1.13)
LYMPHOCYTES # BLD AUTO: 0.92 K/UL (ref 1–4.8)
LYMPHOCYTES NFR BLD: 19.7 % (ref 22–41)
MCH RBC QN AUTO: 30.7 PG (ref 27–33)
MCHC RBC AUTO-ENTMCNC: 34 G/DL (ref 33.7–35.3)
MCV RBC AUTO: 90.1 FL (ref 81.4–97.8)
MONOCYTES # BLD AUTO: 0.27 K/UL (ref 0–0.85)
MONOCYTES NFR BLD AUTO: 5.8 % (ref 0–13.4)
NEUTROPHILS # BLD AUTO: 3.39 K/UL (ref 1.82–7.42)
NEUTROPHILS NFR BLD: 72.6 % (ref 44–72)
NRBC # BLD AUTO: 0 K/UL
NRBC BLD-RTO: 0 /100 WBC
PLATELET # BLD AUTO: 124 K/UL (ref 164–446)
PMV BLD AUTO: 10.5 FL (ref 9–12.9)
POTASSIUM SERPL-SCNC: 3.9 MMOL/L (ref 3.6–5.5)
PROT SERPL-MCNC: 6.2 G/DL (ref 6–8.2)
PROTHROMBIN TIME: 12.5 SEC (ref 12–14.6)
RBC # BLD AUTO: 5.25 M/UL (ref 4.7–6.1)
SODIUM SERPL-SCNC: 141 MMOL/L (ref 135–145)
TROPONIN I SERPL-MCNC: <0.01 NG/ML (ref 0–0.04)
WBC # BLD AUTO: 4.7 K/UL (ref 4.8–10.8)

## 2018-09-15 PROCEDURE — 84484 ASSAY OF TROPONIN QUANT: CPT

## 2018-09-15 PROCEDURE — 700105 HCHG RX REV CODE 258: Performed by: EMERGENCY MEDICINE

## 2018-09-15 PROCEDURE — 700117 HCHG RX CONTRAST REV CODE 255: Performed by: EMERGENCY MEDICINE

## 2018-09-15 PROCEDURE — 70496 CT ANGIOGRAPHY HEAD: CPT

## 2018-09-15 PROCEDURE — 83036 HEMOGLOBIN GLYCOSYLATED A1C: CPT

## 2018-09-15 PROCEDURE — 99285 EMERGENCY DEPT VISIT HI MDM: CPT

## 2018-09-15 PROCEDURE — 94760 N-INVAS EAR/PLS OXIMETRY 1: CPT

## 2018-09-15 PROCEDURE — 700105 HCHG RX REV CODE 258: Performed by: INTERNAL MEDICINE

## 2018-09-15 PROCEDURE — 70498 CT ANGIOGRAPHY NECK: CPT

## 2018-09-15 PROCEDURE — 72100 X-RAY EXAM L-S SPINE 2/3 VWS: CPT

## 2018-09-15 PROCEDURE — 93005 ELECTROCARDIOGRAM TRACING: CPT | Performed by: EMERGENCY MEDICINE

## 2018-09-15 PROCEDURE — 71045 X-RAY EXAM CHEST 1 VIEW: CPT

## 2018-09-15 PROCEDURE — G0378 HOSPITAL OBSERVATION PER HR: HCPCS

## 2018-09-15 PROCEDURE — 85730 THROMBOPLASTIN TIME PARTIAL: CPT

## 2018-09-15 PROCEDURE — 700102 HCHG RX REV CODE 250 W/ 637 OVERRIDE(OP): Performed by: INTERNAL MEDICINE

## 2018-09-15 PROCEDURE — 80053 COMPREHEN METABOLIC PANEL: CPT

## 2018-09-15 PROCEDURE — A9270 NON-COVERED ITEM OR SERVICE: HCPCS | Performed by: INTERNAL MEDICINE

## 2018-09-15 PROCEDURE — 85610 PROTHROMBIN TIME: CPT

## 2018-09-15 PROCEDURE — 99220 PR INITIAL OBSERVATION CARE,LEVL III: CPT | Mod: AI | Performed by: INTERNAL MEDICINE

## 2018-09-15 PROCEDURE — 85025 COMPLETE CBC W/AUTO DIFF WBC: CPT

## 2018-09-15 RX ORDER — POLYETHYLENE GLYCOL 3350 17 G/17G
1 POWDER, FOR SOLUTION ORAL
Status: DISCONTINUED | OUTPATIENT
Start: 2018-09-15 | End: 2018-09-16 | Stop reason: HOSPADM

## 2018-09-15 RX ORDER — EZETIMIBE 10 MG/1
10 TABLET ORAL EVERY EVENING
Status: DISCONTINUED | OUTPATIENT
Start: 2018-09-15 | End: 2018-09-15

## 2018-09-15 RX ORDER — ZOLPIDEM TARTRATE 10 MG/1
10 TABLET ORAL
Status: ON HOLD | COMMUNITY
End: 2018-09-16

## 2018-09-15 RX ORDER — TRIAMCINOLONE ACETONIDE 1 MG/G
CREAM TOPICAL
Status: DISCONTINUED | OUTPATIENT
Start: 2018-09-15 | End: 2018-09-16 | Stop reason: HOSPADM

## 2018-09-15 RX ORDER — ASPIRIN 325 MG
325 TABLET ORAL EVERY EVENING
Status: DISCONTINUED | OUTPATIENT
Start: 2018-09-15 | End: 2018-09-16 | Stop reason: HOSPADM

## 2018-09-15 RX ORDER — ASPIRIN 325 MG
325 TABLET ORAL EVERY EVENING
COMMUNITY

## 2018-09-15 RX ORDER — BENAZEPRIL HYDROCHLORIDE 10 MG/1
10 TABLET ORAL DAILY
Status: DISCONTINUED | OUTPATIENT
Start: 2018-09-16 | End: 2018-09-15

## 2018-09-15 RX ORDER — LEVOTHYROXINE SODIUM 0.15 MG/1
150 TABLET ORAL
COMMUNITY

## 2018-09-15 RX ORDER — LEVOTHYROXINE SODIUM 0.15 MG/1
150 TABLET ORAL
Status: DISCONTINUED | OUTPATIENT
Start: 2018-09-16 | End: 2018-09-16 | Stop reason: HOSPADM

## 2018-09-15 RX ORDER — AMLODIPINE BESYLATE 5 MG/1
10 TABLET ORAL DAILY
Status: DISCONTINUED | OUTPATIENT
Start: 2018-09-16 | End: 2018-09-16 | Stop reason: HOSPADM

## 2018-09-15 RX ORDER — PRIMIDONE 50 MG/1
50 TABLET ORAL 3 TIMES DAILY
Status: DISCONTINUED | OUTPATIENT
Start: 2018-09-15 | End: 2018-09-15

## 2018-09-15 RX ORDER — AMOXICILLIN 250 MG
2 CAPSULE ORAL 2 TIMES DAILY
Status: DISCONTINUED | OUTPATIENT
Start: 2018-09-15 | End: 2018-09-16 | Stop reason: HOSPADM

## 2018-09-15 RX ORDER — OMEPRAZOLE 20 MG/1
20 CAPSULE, DELAYED RELEASE ORAL DAILY
COMMUNITY

## 2018-09-15 RX ORDER — SODIUM CHLORIDE 9 MG/ML
INJECTION, SOLUTION INTRAVENOUS CONTINUOUS
Status: DISCONTINUED | OUTPATIENT
Start: 2018-09-15 | End: 2018-09-16 | Stop reason: HOSPADM

## 2018-09-15 RX ORDER — OMEPRAZOLE 20 MG/1
20 CAPSULE, DELAYED RELEASE ORAL DAILY
Status: DISCONTINUED | OUTPATIENT
Start: 2018-09-16 | End: 2018-09-15

## 2018-09-15 RX ORDER — SODIUM CHLORIDE 9 MG/ML
INJECTION, SOLUTION INTRAVENOUS CONTINUOUS
Status: DISCONTINUED | OUTPATIENT
Start: 2018-09-15 | End: 2018-09-15

## 2018-09-15 RX ORDER — PRIMIDONE 50 MG/1
50 TABLET ORAL 3 TIMES DAILY
Status: DISCONTINUED | OUTPATIENT
Start: 2018-09-16 | End: 2018-09-16 | Stop reason: HOSPADM

## 2018-09-15 RX ORDER — TRIAMCINOLONE ACETONIDE 1 MG/G
CREAM TOPICAL
COMMUNITY

## 2018-09-15 RX ORDER — EZETIMIBE 10 MG/1
10 TABLET ORAL EVERY EVENING
COMMUNITY

## 2018-09-15 RX ORDER — ZOLPIDEM TARTRATE 5 MG/1
10 TABLET ORAL
Status: DISCONTINUED | OUTPATIENT
Start: 2018-09-15 | End: 2018-09-16 | Stop reason: HOSPADM

## 2018-09-15 RX ORDER — ALPRAZOLAM 0.5 MG/1
1 TABLET ORAL
Status: DISCONTINUED | OUTPATIENT
Start: 2018-09-15 | End: 2018-09-16 | Stop reason: HOSPADM

## 2018-09-15 RX ORDER — ATENOLOL 25 MG/1
25 TABLET ORAL DAILY
Status: DISCONTINUED | OUTPATIENT
Start: 2018-09-16 | End: 2018-09-15

## 2018-09-15 RX ORDER — PRIMIDONE 50 MG/1
50 TABLET ORAL 3 TIMES DAILY
COMMUNITY

## 2018-09-15 RX ORDER — EZETIMIBE 10 MG/1
10 TABLET ORAL EVERY EVENING
Status: DISCONTINUED | OUTPATIENT
Start: 2018-09-15 | End: 2018-09-16 | Stop reason: HOSPADM

## 2018-09-15 RX ORDER — BISACODYL 10 MG
10 SUPPOSITORY, RECTAL RECTAL
Status: DISCONTINUED | OUTPATIENT
Start: 2018-09-15 | End: 2018-09-16 | Stop reason: HOSPADM

## 2018-09-15 RX ORDER — ACETAMINOPHEN 325 MG/1
650 TABLET ORAL EVERY 6 HOURS PRN
Status: DISCONTINUED | OUTPATIENT
Start: 2018-09-15 | End: 2018-09-16 | Stop reason: HOSPADM

## 2018-09-15 RX ORDER — PRIMIDONE 50 MG/1
50 TABLET ORAL 3 TIMES DAILY
COMMUNITY
End: 2018-09-15

## 2018-09-15 RX ADMIN — SODIUM CHLORIDE: 9 INJECTION, SOLUTION INTRAVENOUS at 14:00

## 2018-09-15 RX ADMIN — STANDARDIZED SENNA CONCENTRATE AND DOCUSATE SODIUM 2 TABLET: 8.6; 5 TABLET, FILM COATED ORAL at 10:35

## 2018-09-15 RX ADMIN — ASPIRIN 325 MG: 325 TABLET, COATED ORAL at 22:32

## 2018-09-15 RX ADMIN — IOHEXOL 100 ML: 350 INJECTION, SOLUTION INTRAVENOUS at 18:45

## 2018-09-15 RX ADMIN — SODIUM CHLORIDE: 9 INJECTION, SOLUTION INTRAVENOUS at 22:00

## 2018-09-15 RX ADMIN — ALPRAZOLAM 1 MG: 0.5 TABLET ORAL at 22:33

## 2018-09-15 RX ADMIN — ZOLPIDEM TARTRATE 10 MG: 5 TABLET ORAL at 22:33

## 2018-09-15 ASSESSMENT — PATIENT HEALTH QUESTIONNAIRE - PHQ9
2. FEELING DOWN, DEPRESSED, IRRITABLE, OR HOPELESS: NOT AT ALL
1. LITTLE INTEREST OR PLEASURE IN DOING THINGS: NOT AT ALL
SUM OF ALL RESPONSES TO PHQ9 QUESTIONS 1 AND 2: 0

## 2018-09-15 ASSESSMENT — COGNITIVE AND FUNCTIONAL STATUS - GENERAL
WALKING IN HOSPITAL ROOM: A LITTLE
MOVING FROM LYING ON BACK TO SITTING ON SIDE OF FLAT BED: A LITTLE
MOBILITY SCORE: 19
STANDING UP FROM CHAIR USING ARMS: A LITTLE
CLIMB 3 TO 5 STEPS WITH RAILING: A LITTLE
TOILETING: A LITTLE
HELP NEEDED FOR BATHING: A LITTLE
MOVING TO AND FROM BED TO CHAIR: A LITTLE
SUGGESTED CMS G CODE MODIFIER DAILY ACTIVITY: CJ
SUGGESTED CMS G CODE MODIFIER MOBILITY: CK
DAILY ACTIVITIY SCORE: 22

## 2018-09-15 ASSESSMENT — PAIN SCALES - GENERAL: PAINLEVEL_OUTOF10: 2

## 2018-09-15 ASSESSMENT — LIFESTYLE VARIABLES
EVER_SMOKED: YES
ALCOHOL_USE: NO

## 2018-09-15 ASSESSMENT — PAIN SCALES - WONG BAKER: WONGBAKER_NUMERICALRESPONSE: HURTS A LITTLE MORE

## 2018-09-15 NOTE — ED TRIAGE NOTES
Patient SHANNAN from [facility] with complaints of dizziness upon standing that began yesterday. He denies any recent falls or traumas.  Had labs and EKG completed PTA. According to EMS pt was unable to complete orthostatics due to dizziness. He denies chest pain or SOB. +back pain that is his baseline.   IV in place per EMS and 1,500ml NS administered PTA.

## 2018-09-15 NOTE — ED NOTES
Patient is resting comfortably.He was provided water and a box meal per request. He denies any nausea

## 2018-09-15 NOTE — ED NOTES
The Medication Reconciliation process has been completed by interviewing the patient    Allergies have been reviewed  Antibiotic use in 30 days - none    Home Pharmacy:  [facility]

## 2018-09-15 NOTE — ED PROVIDER NOTES
ED Provider Note    CHIEF COMPLAINT  Chief Complaint   Patient presents with   • Dizziness       HPI  Corbin Schwarz is a 72 y.o. male who presents for evaluation of dizziness.  Patient states around 9 PM last night he was sitting and feeling fine.  He got up and felt off balance.  He states he is falling to the right and behind him.  Patient got up to go the restroom in the middle night and states he almost fell again.  [facility]  The patient denies: Headache, double vision, blurring of vision, chest pain, shortness of breath, cough, sputum, hemoptysis, nausea, vomiting, hematemesis, melena hematochezia, hematuria, dysuria, unilateral motor weakness or paresthesias.  States he feels some numbness over the right side of his face.  No other acute symptomatology or complaints.    REVIEW OF SYSTEMS  See HPI for further details. All other systems negative.    PAST MEDICAL HISTORY  Past Medical History:   Diagnosis Date   • Bronchitis 2001   • CATARACT     removed   • Diabetes     oral medication   • Hiatus hernia syndrome    • Hypertension    • Pain 1/16/12    3/10 lower back   • Unspecified disorder of thyroid    Hypothyroidism  Erythrocytosis  Aortic valve stenosis  Thrombocytopenia    FAMILY HISTORY  No family history on file.    SOCIAL HISTORY  Past history of tobacco use; no alcohol or drug;    SURGICAL HISTORY  Past Surgical History:   Procedure Laterality Date   • WERNER BY LAPAROSCOPY  8/9/2017    Procedure: WERNER BY LAPAROSCOPY;  Surgeon: Rian Bearden M.D.;  Location: [facility];  Service:    • LUMBAR LAMINECTOMY DISKECTOMY  2/9/2012    Performed by GRETTA THORPE at SURGERY [facility]   • OTHER  2008    right shoulder        CURRENT MEDICATIONS  See nurse's note    ALLERGIES  Allergies   Allergen Reactions   • Codeine Itching   • Hydrocodone Itching     insomnia   • Oxycodone Itching     insomnia   • Cymbalta [Duloxetine  "Hcl]      irritable   • Statins [Hmg-Coa-R Inhibitors]      Dizzy   • Tramadol Itching     insomnia       PHYSICAL EXAM  VITAL SIGNS: Temp 37.2 °C (98.9 °F)   Ht 1.905 m (6' 3\")    Constitutional: 72-year-old male, awake, oriented ×3  HENT: ,Atraumatic, Bilateral external ears normal, tympanic membranes clear, Oropharynx moist, No oral exudates, Nose normal.   Eyes: PERRL, EOMI, Conjunctiva normal, No discharge.   Neck: Normal range of motion, No tenderness, Supple, No stridor.   Lymphatic: No lymphadenopathy noted.   Cardiovascular: Normal heart rate, Normal rhythm, No murmurs, No rubs, No gallops.   Thorax & Lungs: Normal Equal breath sounds, No respiratory distress, No wheezing, no stridor, no rales. No chest tenderness.   Abdomen: Soft, nontender, nondistended, no organomegaly, positive bowel sounds normal in quality. No guarding or rebound.  Skin: mildly decreased skin turgor, pink, warm, dry. No rashes, petechiae, purpura. Normal capillary refill.   Back: No tenderness, No CVA tenderness.   Extremities: Intact distal pulses, No edema, No tenderness, No cyanosis, No clubbing. Vascular: Pulses are 2+, symmetric in the upper and lower extremities.  Musculoskeletal: Diffuse arthritic changes. No tenderness to palpation or major deformities noted.   Neurologic: Alert & oriented x 3, Normal motor function, Normal sensory function, decreased ability to perform finger to nose; patient apparently had fairly profound ataxia upon standing;  Psychiatric: Affect normal, Judgment normal, Mood normal.     EKG  I have interpreted: Rate 70, rhythm sinus, left axis deviation, first-degree AV block, incomplete right bundle branch block pattern, no acute STEMI, 12-lead EKG, this is a change from a tracing of 8/9/17;    RADIOLOGY/PROCEDURES  CT scan of the brain at the [facility] showed no acute abnormalities;  Chest x-ray from the [facility] facility showed no acute cardiopulmonary processes;    COURSE & MEDICAL DECISION " MAKING  Pertinent Labs & Imaging studies reviewed. (See chart for details)  1.  Monitor  2.  IV normal saline    Laboratory studies from the [facility] showed: CMP shows a calcium of 8.3, protein 6.3, glucose 160, otherwise within normal; CBC shows white count of 4.9, 76% neutrophils, 15% lymphocytes, 4% monocytes, hemoglobin 15.9, hematocrit 50.1;    Laboratory studies from our facility showed: CBC shows white count 4.7, 72% neutrophils, 19% lymphocytes, 5% monocytes, hemoglobin 16.1, hematocrit 47.3; coags within normal; troponin less than 0.01;    Discussion/consultation: At this time, the patient presents for evaluation of ataxia.  The patient does not meet criteria for thrombolytic therapy for stroke syndrome as the patient's been having symptoms for over 18 hours.  At this time, I spoke with the hospitalist on call.  Patient will be admitted for further monitoring, treatment, and care.    FINAL IMPRESSION  1. Ataxia    2. Type 2 diabetes mellitus with complication, without long-term current use of insulin ([facility])    3. Hyperlipidemia, unspecified hyperlipidemia type    4. Hypothyroidism, unspecified type           PLAN  1.  The patient will be admitted for further monitoring, treatment, and care.    Electronically signed by: Guy G Gansert, 9/15/2018 3:13 PM

## 2018-09-16 ENCOUNTER — APPOINTMENT (OUTPATIENT)
Dept: RADIOLOGY | Facility: MEDICAL CENTER | Age: 73
End: 2018-09-16
Attending: INTERNAL MEDICINE
Payer: MEDICARE

## 2018-09-16 VITALS
HEIGHT: 76 IN | HEART RATE: 64 BPM | WEIGHT: 264.55 LBS | RESPIRATION RATE: 16 BRPM | SYSTOLIC BLOOD PRESSURE: 138 MMHG | TEMPERATURE: 98 F | BODY MASS INDEX: 32.22 KG/M2 | DIASTOLIC BLOOD PRESSURE: 76 MMHG | OXYGEN SATURATION: 94 %

## 2018-09-16 PROBLEM — R25.1 TREMOR: Status: RESOLVED | Noted: 2018-09-15 | Resolved: 2018-09-16

## 2018-09-16 LAB
ANION GAP SERPL CALC-SCNC: 8 MMOL/L (ref 0–11.9)
BUN SERPL-MCNC: 19 MG/DL (ref 8–22)
CALCIUM SERPL-MCNC: 8.2 MG/DL (ref 8.5–10.5)
CHLORIDE SERPL-SCNC: 108 MMOL/L (ref 96–112)
CHOLEST SERPL-MCNC: 169 MG/DL (ref 100–199)
CO2 SERPL-SCNC: 23 MMOL/L (ref 20–33)
CREAT SERPL-MCNC: 1.12 MG/DL (ref 0.5–1.4)
ERYTHROCYTE [DISTWIDTH] IN BLOOD BY AUTOMATED COUNT: 45 FL (ref 35.9–50)
GLUCOSE SERPL-MCNC: 95 MG/DL (ref 65–99)
HCT VFR BLD AUTO: 43.8 % (ref 42–52)
HDLC SERPL-MCNC: 46 MG/DL
HGB BLD-MCNC: 15.2 G/DL (ref 14–18)
LDLC SERPL CALC-MCNC: 104 MG/DL
MCH RBC QN AUTO: 31.1 PG (ref 27–33)
MCHC RBC AUTO-ENTMCNC: 34.7 G/DL (ref 33.7–35.3)
MCV RBC AUTO: 89.6 FL (ref 81.4–97.8)
PLATELET # BLD AUTO: 133 K/UL (ref 164–446)
PMV BLD AUTO: 10.5 FL (ref 9–12.9)
POTASSIUM SERPL-SCNC: 3.8 MMOL/L (ref 3.6–5.5)
RBC # BLD AUTO: 4.89 M/UL (ref 4.7–6.1)
SODIUM SERPL-SCNC: 139 MMOL/L (ref 135–145)
TRIGL SERPL-MCNC: 96 MG/DL (ref 0–149)
TSH SERPL DL<=0.005 MIU/L-ACNC: 1.86 UIU/ML (ref 0.38–5.33)
VIT B12 SERPL-MCNC: 534 PG/ML (ref 211–911)
WBC # BLD AUTO: 4.9 K/UL (ref 4.8–10.8)

## 2018-09-16 PROCEDURE — 99217 PR OBSERVATION CARE DISCHARGE: CPT | Performed by: HOSPITALIST

## 2018-09-16 PROCEDURE — 96372 THER/PROPH/DIAG INJ SC/IM: CPT

## 2018-09-16 PROCEDURE — A9270 NON-COVERED ITEM OR SERVICE: HCPCS | Performed by: INTERNAL MEDICINE

## 2018-09-16 PROCEDURE — 97165 OT EVAL LOW COMPLEX 30 MIN: CPT

## 2018-09-16 PROCEDURE — 70551 MRI BRAIN STEM W/O DYE: CPT

## 2018-09-16 PROCEDURE — 97161 PT EVAL LOW COMPLEX 20 MIN: CPT

## 2018-09-16 PROCEDURE — G0378 HOSPITAL OBSERVATION PER HR: HCPCS

## 2018-09-16 PROCEDURE — G8987 SELF CARE CURRENT STATUS: HCPCS | Mod: CI

## 2018-09-16 PROCEDURE — G8980 MOBILITY D/C STATUS: HCPCS | Mod: CI

## 2018-09-16 PROCEDURE — G9168 MEMORY CURRENT STATUS: HCPCS | Mod: CI

## 2018-09-16 PROCEDURE — G9169 MEMORY GOAL STATUS: HCPCS | Mod: CI

## 2018-09-16 PROCEDURE — 92523 SPEECH SOUND LANG COMPREHEN: CPT

## 2018-09-16 PROCEDURE — 80048 BASIC METABOLIC PNL TOTAL CA: CPT

## 2018-09-16 PROCEDURE — G9170 MEMORY D/C STATUS: HCPCS | Mod: CI

## 2018-09-16 PROCEDURE — 82607 VITAMIN B-12: CPT

## 2018-09-16 PROCEDURE — 700102 HCHG RX REV CODE 250 W/ 637 OVERRIDE(OP): Performed by: INTERNAL MEDICINE

## 2018-09-16 PROCEDURE — 80061 LIPID PANEL: CPT

## 2018-09-16 PROCEDURE — 36415 COLL VENOUS BLD VENIPUNCTURE: CPT

## 2018-09-16 PROCEDURE — 84443 ASSAY THYROID STIM HORMONE: CPT

## 2018-09-16 PROCEDURE — 700111 HCHG RX REV CODE 636 W/ 250 OVERRIDE (IP): Performed by: INTERNAL MEDICINE

## 2018-09-16 PROCEDURE — G8979 MOBILITY GOAL STATUS: HCPCS | Mod: CI

## 2018-09-16 PROCEDURE — 85027 COMPLETE CBC AUTOMATED: CPT

## 2018-09-16 PROCEDURE — G8978 MOBILITY CURRENT STATUS: HCPCS | Mod: CI

## 2018-09-16 PROCEDURE — G8989 SELF CARE D/C STATUS: HCPCS | Mod: CI

## 2018-09-16 PROCEDURE — G8988 SELF CARE GOAL STATUS: HCPCS | Mod: CI

## 2018-09-16 RX ADMIN — AMLODIPINE BESYLATE 10 MG: 5 TABLET ORAL at 06:10

## 2018-09-16 RX ADMIN — PRIMIDONE 50 MG: 50 TABLET ORAL at 06:10

## 2018-09-16 RX ADMIN — LEVOTHYROXINE SODIUM 150 MCG: 150 TABLET ORAL at 06:10

## 2018-09-16 RX ADMIN — STANDARDIZED SENNA CONCENTRATE AND DOCUSATE SODIUM 2 TABLET: 8.6; 5 TABLET, FILM COATED ORAL at 06:10

## 2018-09-16 RX ADMIN — ENOXAPARIN SODIUM 40 MG: 100 INJECTION SUBCUTANEOUS at 06:10

## 2018-09-16 RX ADMIN — PRIMIDONE 50 MG: 50 TABLET ORAL at 12:08

## 2018-09-16 ASSESSMENT — PAIN SCALES - GENERAL
PAINLEVEL_OUTOF10: 0
PAINLEVEL_OUTOF10: 2

## 2018-09-16 ASSESSMENT — GAIT ASSESSMENTS
GAIT LEVEL OF ASSIST: SUPERVISED
ASSISTIVE DEVICE: FRONT WHEEL WALKER
DISTANCE (FEET): 1000

## 2018-09-16 ASSESSMENT — COGNITIVE AND FUNCTIONAL STATUS - GENERAL
HELP NEEDED FOR BATHING: A LITTLE
DRESSING REGULAR LOWER BODY CLOTHING: A LITTLE
DAILY ACTIVITIY SCORE: 21
TOILETING: A LITTLE
MOBILITY SCORE: 23
SUGGESTED CMS G CODE MODIFIER DAILY ACTIVITY: CJ
WALKING IN HOSPITAL ROOM: A LITTLE
SUGGESTED CMS G CODE MODIFIER MOBILITY: CI

## 2018-09-16 ASSESSMENT — ACTIVITIES OF DAILY LIVING (ADL): TOILETING: INDEPENDENT

## 2018-09-16 NOTE — PROGRESS NOTES
Monitor summary: SR 69-74, MN .20, QRS .12, QT .36 with rare PVC and 4BBB per strip from monitor room.

## 2018-09-16 NOTE — PROGRESS NOTES
Pt A&Ox4, denies N/T, N/V, and dizziness. Pt ambulates with SBA and FWW but tends to lean to the right. Edu pt of POC. Call light and personal belongings within reach.

## 2018-09-16 NOTE — H&P
Hospital Medicine History and Physical    Date of Service  9/15/2018    Chief Complaint  Chief Complaint   Patient presents with   • Dizziness       History of Presenting Illness  72 y.o. male who presented 9/15/2018 with Dizziness  He has remote history of stroke on chart but he denied stroke, heart or lung problems.  Transferred from [facility]. Complained of imbalance 9pm last night. He feels he is falling to right. Then nearly fell in bathroom. He denied vertigo, visual deficits, hearing loss, dysmetria, dysarthria or hemiparesis.  At the ED, afebrile, hemodynamically stable. Outside CT images per ED doctor no acute bleed. EKG was not up at [facility]. Not hypoglycemic.    I continued further work-up and addressed/followed management at the ED first prior to admission order:  CTA head and neck ordered. Results pending.  EKG pending.    When I saw him at the ED, no acute distress. Cognitive deficits as he was confused about the medications he is getting. Otherwise baseline. No gross motor or sensory deficits.     Primary Care Physician  LOUISE Hunter.    Consultants      Code Status  full    Review of Systems  Review of Systems   Unable to perform ROS: Mental acuity        Past Medical History  Past Medical History:   Diagnosis Date   • Pain 1/16/12    3/10 lower back   • Bronchitis 2001   • CATARACT     removed   • Diabetes     oral medication   • Hiatus hernia syndrome    • Hypertension    • Unspecified disorder of thyroid        Surgical History  Past Surgical History:   Procedure Laterality Date   • WERNER BY LAPAROSCOPY  8/9/2017    Procedure: WERNER BY LAPAROSCOPY;  Surgeon: Rian Bearden M.D.;  Location: SURGERY [facility];  Service:    • LUMBAR LAMINECTOMY DISKECTOMY  2/9/2012    Performed by GRETTA THORPE at SURGERY [facility]   • OTHER  2008    right shoulder        Medications  No current facility-administered medications on file prior to encounter.      Current Outpatient Prescriptions  on File Prior to Encounter   Medication Sig Dispense Refill   • amlodipine (NORVASC) 10 MG Tab Take 10 mg by mouth every day.     • atenolol (TENORMIN) 25 MG Tab Take 25 mg by mouth every day.     • benazepril (LOTENSIN) 10 MG Tab Take 10 mg by mouth every day.     • alprazolam (XANAX) 1 MG TABS Take 1 mg by mouth every bedtime.         Family History  No family history on file.  Reviewed, no pertinent family history.  Social History  Social History   Substance Use Topics   • Smoking status: Former Smoker     Packs/day: 2.50     Years: 40.00     Types: Cigarettes     Quit date: 2006   • Smokeless tobacco: Current User     Types: Chew   • Alcohol use No       Allergies  Allergies   Allergen Reactions   • Codeine Itching   • Hydrocodone Itching     insomnia   • Oxycodone Itching     insomnia   • Cymbalta [Duloxetine Hcl]      irritable   • Statins [Hmg-Coa-R Inhibitors]      Dizzy   • Tramadol Itching     insomnia        Physical Exam  Laboratory   Hemodynamics  Temp (24hrs), Av.2 °C (98.9 °F), Min:37.2 °C (98.9 °F), Max:37.2 °C (98.9 °F)   Temperature: 37.2 °C (98.9 °F)  Pulse  Av.7  Min: 60  Max: 75 Heart Rate (Monitored): 65  Blood Pressure : 138/69, NIBP: 142/64      Respiratory      Respiration: 16, Pulse Oximetry: 94 %             Physical Exam   Constitutional: He appears well-developed and well-nourished.   HENT:   Head: Normocephalic and atraumatic.   Eyes: Conjunctivae and EOM are normal. No scleral icterus.   Neck: Normal range of motion. Neck supple.   Cardiovascular: Normal rate and regular rhythm.  Exam reveals no gallop and no friction rub.    No murmur heard.  Pulmonary/Chest: Effort normal and breath sounds normal. No respiratory distress. He has no wheezes. He has no rales.   Abdominal: Soft. Bowel sounds are normal. He exhibits no distension. There is no tenderness. There is no rebound and no guarding.   Musculoskeletal: He exhibits no edema or tenderness.   Neurological: He is alert.    Mild cognitive deficits  Mild ataxia but no dysmetria   Skin: Skin is warm.   Psychiatric: He has a normal mood and affect. His behavior is normal.       Recent Labs      09/15/18   1545   WBC  4.7*   RBC  5.25   HEMOGLOBIN  16.1   HEMATOCRIT  47.3   MCV  90.1   MCH  30.7   MCHC  34.0   RDW  45.4   PLATELETCT  124*   MPV  10.5     Recent Labs      09/15/18   1545   SODIUM  141   POTASSIUM  3.9   CHLORIDE  108   CO2  23   GLUCOSE  106*   BUN  13   CREATININE  1.02   CALCIUM  8.4*     Recent Labs      09/15/18   1545   ALTSGPT  20   ASTSGOT  15   ALKPHOSPHAT  44   TBILIRUBIN  0.7   GLUCOSE  106*     Recent Labs      09/15/18   1545   APTT  26.2   INR  0.96             Lab Results   Component Value Date    TROPONINI <0.01 09/15/2018     Urinalysis:  No results found for: SPECGRAVITY, GLUCOSEUR, KETONES, NITRITE, WBCURINE, RBCURINE, BACTERIA, EPITHELCELL     Imaging  Ct-cta Head With & W/o-post Process    Result Date: 9/15/2018  9/15/2018 6:15 PM HISTORY/REASON FOR EXAM:  Neurological Deficit Dizziness. TECHNIQUE/EXAM DESCRIPTION: CT angiogram of the [place] without and with contrast.  Initial precontrast images were obtained of the head from the skull base through the vertex.  Postcontrast images were obtained of the [place] following the power injection of nonionic contrast at 5.0 mL/sec. Thin-section helical images were obtained with overlapping reconstruction interval. Coronal and sagittal multiplanar volume reformats were generated.  3D angiographic images were reviewed on PACS.  Maximum intensity projection (MIP) images were generated and reviewed. 100 mL of Omnipaque 350 nonionic contrast was injected intravenously. Low dose optimization technique was utilized for this CT exam including automated exposure control and adjustment of the mA and/or kV according to patient size. COMPARISON:  Outside CT head 9/15/2018 FINDINGS: The posterior circulation shows the distal vertebral arteries to be  patent. The vertebrobasilar confluence is intact. The basilar artery is patent. No aneurysm or occlusive lesion is evident. The anterior circulation shows no stenotic or occlusive lesion. No aneurysm is evident about the [place]. Cortical sulci are within normal limits. The lateral ventricles are normal in size and symmetric. No significant mass effect or midline shift. The basal cisterns are patent. No evidence for intracranial hemorrhage. Calcifications of the carotid arteries noted.     1.  No intracranial aneurysm, focal stenosis, or abrupt large vessel cut off. 2.  No acute intracranial abnormality.    Ct-cta Neck With & W/o-post Processing    Result Date: 9/15/2018  9/15/2018 6:15 PM HISTORY/REASON FOR EXAM:  Neurological Deficit Dizziness. TECHNIQUE/EXAM DESCRIPTION: CT angiogram of the neck with contrast. Postcontrast images were obtained of the neck from the great vessels through the skull base following the power injection of nonionic contrast at 5.0 mL/sec. Thin-section helical images were obtained with overlapping reconstruction interval. Coronal and oblique multiplanar volume reformats were generated. Cervical internal carotid artery percent stenosis is calculated using the standard method according to the NASCET criteria wherein a segment of uniform caliber mid or distal cervical internal carotid is used as the reference denominator. 3D angiographic images were reviewed on PACS.  Maximum intensity projection (MIP) images were generated and reviewed 100 mL of Omnipaque 350 nonionic contrast was injected intravenously. Low dose optimization technique was utilized for this CT exam including automated exposure control and adjustment of the mA and/or kV according to patient size. COMPARISON:  None. FINDINGS: Mild atherosclerotic calcification of thoracic aortic arch. Mild atherosclerotic calcification of the carotid bulb with less than 50% luminal narrowing.  Internal carotid artery is patent. Mild  calcification of the carotid bulb with less than 50% luminal narrowing.  Internal carotid artery is patent. The cervical vertebral arteries are normal bilaterally. The neck soft tissues and lung apices in the field of view are unremarkable. Multilevel degenerative changes cervical spine.     1.  Mild stenosis of the carotid bulbs bilaterally, less than 50% luminal narrowing. 2.  No focal high-grade stenosis, dissection or occlusion of the cervical carotid or vertebral arteries.    Dx-chest-portable (1 View)    Result Date: 9/15/2018  9/15/2018 3:35 PM HISTORY/REASON FOR EXAM:  Shortness of Breath. Balance problems. TECHNIQUE/EXAM DESCRIPTION AND NUMBER OF VIEWS: Single portable view of the chest. COMPARISON: Outside chest x-ray 9/15/2018 10:07 AM FINDINGS: Cardiac mediastinal contour is unchanged. No focal pulmonary consolidation. No pleural fluid collection or pneumothorax. No major bony abnormality is seen.     1.  No acute cardiopulmonary disease. 2.  Stable cardiomegaly.    Dx-lumbar Spine-2 Or 3 Views    Result Date: 9/15/2018  9/15/2018 7:24 PM HISTORY/REASON FOR EXAM:  Ataxia. TECHNIQUE/ EXAM DESCRIPTION AND NUMBER OF VIEWS:  3 views of the lumbar spine. COMPARISON: None. FINDINGS: Vertebral alignment is preserved. Vertebral body heights are preserved. Multilevel loss of disc height and osteophyte formation. Apparent facet hypertrophy at L4-5 and L5-S1. Lumbosacral junction is intact. Pedicles appear intact. Contrast present within the renal collecting systems and bladder from prior CT scan. Atherosclerotic calcification of abdominal aorta.     1.  Moderate degenerative change of lumbar spine. 2.  No fracture or subluxation.     Assessment/Plan     I anticipate this patient is appropriate for observation status at this time.    * Ataxia   Assessment & Plan    Telermetry, neuro-checks, MRI brain, ASA, statin and ordered lipid panel, PT/OT  Ordered B12 and TSH  CTA pending  EKG pending; sinus on monitor  currently          Obesity (BMI 35.0-39.9 without comorbidity)- (present on admission)   Assessment & Plan    Encourage weight loss and diet  Outpatient sleep study        Hyperlipidemia- (present on admission)   Assessment & Plan    Zetia listed but he denied it  Will restart on Zetia        HTN (hypertension)- (present on admission)   Assessment & Plan    Stable  Permissive hypertension then resume blood pressure medications in the AM        Tremor   Assessment & Plan    Continue primidone        Hypothyroidism- (present on admission)   Assessment & Plan    Continue Synthroid   Check TSH            VTE prophylaxis: Pharmacologic.    I reviewed  the chart, notes, vitals, labs, imaging, ordering labs, evaluating Corbin Schwarz for assessment, enacting the plan above. I counseled Corbin Schwarz and answering questions. Discussed with ED physician. Medical decision making is therefore complex. Time was devoted to counseling and coordinating care including review of records, pertinent lab data and studies, as well as discussing diagnostic evaluation and work up, planned therapeutic interventions and future disposition of care. Where indicated, the assessment and plan reflect discussion of patient with consultants, other healthcare providers, family members, and additional research needed to obtain further information in formulating the plan of care for Corbin Schwarz.

## 2018-09-16 NOTE — THERAPY
"73 y/o male adm for reports of dizziness, onset while awake daytime. Pt reports dizziness is improved.  Hx of CVA, lumbar sx. BLE motor and sensaory components intact, intact coordination. Balance during level ground ambulation with FWW intact, no LOB noted with attentional demand, sudden starts and stops, directional changes, head turns. No reports of nausea or vomiting or exacerbation of dizziness. No further acute PT services required at this time.    Physical Therapy Evaluation completed.   Bed Mobility:  Supine to Sit: Supervised  Transfers: Sit to Stand: Supervised  Gait: Level Of Assist: Supervised with Front-Wheel Walker       Plan of Care: Patient with no further skilled PT needs in the acute care setting at this time  Discharge Recommendations: Equipment: No Equipment Needed. Post-acute therapy Discharge to home with outpatient or home health for additional skilled therapy services.    See \"Rehab Therapy-Acute\" Patient Summary Report for complete documentation.     "

## 2018-09-16 NOTE — PROGRESS NOTES
Monitor summary: SR 61-68, CT . 22, QRS .10, QT .38 with occasional PVC's per strip from monitor room.

## 2018-09-16 NOTE — PROGRESS NOTES
Pt AOx4, denies pain at this time. On RA, tele in place., regular diet. Ambulates with SBA and fww. Slight Right lean per night RN. Ns running at 75 ml/h. Plan today is to complete MRI and EKG. All needs met at this time. Call light w/in reach, hourly rounding in place.

## 2018-09-16 NOTE — CARE PLAN
Problem: Communication  Goal: The ability to communicate needs accurately and effectively will improve    Intervention: Educate patient and significant other/support system about the plan of care, procedures, treatments, medications and allow for questions  Pt updated on upcoming tests, will review medications.      Problem: Venous Thromboembolism (VTW)/Deep Vein Thrombosis (DVT) Prevention:  Goal: Patient will participate in Venous Thrombosis (VTE)/Deep Vein Thrombosis (DVT)Prevention Measures    Intervention: Ensure patient wears graduated elastic stockings (JENNIFER hose) and/or SCDs, if ordered, when in bed or chair (Remove at least once per shift for skin check)  SCDs in place to reduce VTE risk.

## 2018-09-16 NOTE — THERAPY
"Occupational Therapy Evaluation completed.   Functional Status:  Pt is a 73 y/o male admitted with ataxia, so far imaging is negative. He is currently at a supv level for basic self cares, functional mobility, and functional transfers with FWW. BUE AROM/strength/coordination WDL. He denies any further deficits in ADLs at this time, and will have support from his dtr as needed.  Plan of Care: Patient with no further skilled OT needs in the acute care setting at this time  Discharge Recommendations:  Equipment: No Equipment Needed. Discharge to home with outpatient or home health for additional skilled therapy services.    See \"Rehab Therapy-Acute\" Patient Summary Report for complete documentation.    "

## 2018-09-16 NOTE — FACE TO FACE
Face to Face Supporting Documentation - Home Health    The encounter with this patient was in whole or in part the primary reason for home health admission.    Date of encounter:   Patient:                    MRN:                       YOB: 2018  Corbin Schwarz  [id number]  1945     Home health to see patient for:  Skilled Nursing care for assessment, interventions & education, Physical Therapy evaluation and treatment and Occupational therapy evaluation and treatment    Skilled need for:  Exacerbation of Chronic Disease State chronic dizziness    Skilled nursing interventions to include:  Comment: exam, vitals, PT    Homebound status evidenced by:  Needs the assistance of another person in order to leave the home. Leaving home requires a considerable and taxing effort. There is a normal inability to leave the home.    Community Physician to provide follow up care: AFSANEH Hunter     Optional Interventions? No      I certify the face to face encounter for this home health care referral meets the CMS requirements and the encounter/clinical assessment with the patient was, in whole, or in part, for the medical condition(s) listed above, which is the primary reason for home health care. Based on my clinical findings: the service(s) are medically necessary, support the need for home health care, and the homebound criteria are met.  I certify that this patient has had a face to face encounter by myself.  Yaritza Shah M.D. - NPI: [id number]

## 2018-09-16 NOTE — CARE PLAN
Problem: Safety  Goal: Will remain free from injury    Intervention: Provide assistance with mobility  Pt ambulates with SBA with FWW, pt calls appropriately for assistance      Problem: Knowledge Deficit  Goal: Knowledge of the prescribed therapeutic regimen will improve  Outcome: PROGRESSING AS EXPECTED  Edu pt of POC, provided stroke education handbook, all questions answered at this time

## 2018-09-16 NOTE — DISCHARGE INSTRUCTIONS
Discharge Instructions    Discharged to home by car with friend. Discharged via wheelchair, hospital escort: Yes.  Special equipment needed: Not Applicable    Be sure to schedule a follow-up appointment with your primary care doctor or any specialists as instructed.     Discharge Plan:   Diet Plan: Discussed  Activity Level: Discussed  Confirmed Follow up Appointment: Patient to Call and Schedule Appointment  Confirmed Symptoms Management: Discussed  Medication Reconciliation Updated: Yes  Pneumococcal Vaccine Administered/Refused: Not given - Patient refused pneumococcal vaccine  Influenza Vaccine Indication: Patient Refuses    I understand that a diet low in cholesterol, fat, and sodium is recommended for good health. Unless I have been given specific instructions below for another diet, I accept this instruction as my diet prescription.   Other diet: Regular healthy diet    Special Instructions: None    · Is patient discharged on Warfarin / Coumadin?   No     Depression / Suicide Risk    As you are discharged from this [facility] facility, it is important to learn how to keep safe from harming yourself.    Recognize the warning signs:  · Abrupt changes in personality, positive or negative- including increase in energy   · Giving away possessions  · Change in eating patterns- significant weight changes-  positive or negative  · Change in sleeping patterns- unable to sleep or sleeping all the time   · Unwillingness or inability to communicate  · Depression  · Unusual sadness, discouragement and loneliness  · Talk of wanting to die  · Neglect of personal appearance   · Rebelliousness- reckless behavior  · Withdrawal from people/activities they love  · Confusion- inability to concentrate     If you or a loved one observes any of these behaviors or has concerns about self-harm, here's what you can do:  · Talk about it- your feelings and reasons for harming yourself  · Remove any means that you might use to hurt  yourself (examples: pills, rope, extension cords, firearm)  · Get professional help from the community (Mental Health, Substance Abuse, psychological counseling)  · Do not be alone:Call your Safe Contact- someone whom you trust who will be there for you.  · Call your local CRISIS HOTLINE 231-4030 or 894-646-4187  · Call your local Children's Mobile Crisis Response Team [place] (020) 377-7834 or www.[employer]  · Call the toll free National Suicide Prevention Hotlines   · National Suicide Prevention Lifeline 183-397-NRUC (5771)  · National Hope Line Network 800-SUICIDE (458-8717)

## 2018-09-16 NOTE — DISCHARGE PLANNING
Anticipated Discharge Disposition: Home    Action: This RN CM spoke with pt at bedside.  Per the pt he has an RN and Delmy come to the house daily already.  In addition, his daughter lives with him and does all the cooking, cleaning and household chores.  The pt does NOT want HH.    Barriers to Discharge: none    Plan: No dc needs identified at this time.

## 2018-09-16 NOTE — THERAPY
"Speech Language Therapy Evaluation completed to address cognition  Functional Status:  Pt seen on this date for cognitive linguistic evaluation. Pt A&Ox4 and agreeable to evaluation. Per pt, he feels back to normal cognitively, however, still expressing concerns for dizziness while standing/walking. Non-standardized measures were used to assess an array of cognitive domains, which found min deficits in auditory/logical memory and divided attention. Auditory comprehension, thought organization, delayed/immediate recall, reasoning, problem solving, writing, safety awareness, and following written directions were found to be within functional limits. At this time, no further acute SLP service are recommended, but recommend that pt transition home with family for support. Pt reporting that he lives at home with his wife and daughter and daughter is available 24 hours to assist with safe transition home. At this time, no further acute SLP services needed. Provided education to pt that if he notices any change in cognition to reach out to PCP for outpatient speech referral and her verbalized understanding. Cognitive linguistic education provided to pt and he verbalized understanding.    Recommendations:  No further acute SLP services  Plan of Care: Patient with no further skilled SLP needs in the acute care setting at this time  Post-Acute Therapy: Currently anticipate no further skilled therapy needs once patient is discharged from the inpatient setting.    See \"Rehab Therapy-Acute\" Patient Summary Report for complete documentation.   "

## 2018-09-16 NOTE — DISCHARGE SUMMARY
Discharge Summary    CHIEF COMPLAINT ON ADMISSION  Chief Complaint   Patient presents with   • Dizziness       Reason for Admission  EMS     Admission Date  9/15/2018    CODE STATUS  Full Code    HPI & HOSPITAL COURSE  This is a 72 y.o. male with a history of chronic intermittent dizziness, presented here with acute dizziness and associated ataxia.  He states that symptoms were very similar to his previous episodes and at this time they have nearly resolved.  He had been referred to ENT in the past but missed the appointment due some health issues in his wife.  He agrees to follow up with them as previously planned.  He was cleared by PT, OT and speech and will be discharged home with home health.  He agrees to keep a home blood pressure log and I have recommended that his chronic benzo rx and ambien be re evaluated as an outpatient.  I have recommended that he stop the ambien at this time.  He also has mild thrombocytopenia, which will need to be monitored as an outpatient.     Therefore, he is discharged in fair and stable condition to home with organized home healthcare and close outpatient follow-up.    The patient recovered much more quickly than anticipated on admission.    Discharge Date  9/16/18    FOLLOW UP ITEMS POST DISCHARGE  Pcp    DISCHARGE DIAGNOSES  Principal Problem:    Ataxia POA: Unknown  Active Problems:    HTN (hypertension) POA: Yes    Hyperlipidemia POA: Yes    Obesity (BMI 35.0-39.9 without comorbidity) POA: Yes      Overview: BMI 35.92 kg/m2     Hypothyroidism POA: Yes  Resolved Problems:    Tremor POA: Unknown      FOLLOW UP  No future appointments.  Melany Smith, LOUIENAbelP.  [address] [facility] [address]  [phone]    Schedule an appointment as soon as possible for a visit in 2 weeks  Follow Up        In 1 week        MEDICATIONS ON DISCHARGE     Medication List      CONTINUE taking these medications      Instructions   ALPRAZolam 1 MG Tabs  Commonly known as:  XANAX   Take 1 mg by  mouth every bedtime.  Dose:  1 mg     amLODIPine 10 MG Tabs  Commonly known as:  NORVASC   Take 10 mg by mouth every day.  Dose:  10 mg     aspirin 325 MG Tabs  Commonly known as:  ASA   Take 325 mg by mouth every evening.  Dose:  325 mg     atenolol 25 MG Tabs  Commonly known as:  TENORMIN   Take 25 mg by mouth every day.  Dose:  25 mg     benazepril 10 MG Tabs  Commonly known as:  LOTENSIN   Take 10 mg by mouth every day.  Dose:  10 mg     ezetimibe 10 MG Tabs  Commonly known as:  ZETIA   Take 10 mg by mouth every evening.  Dose:  10 mg     levothyroxine 150 MCG Tabs  Commonly known as:  SYNTHROID   Take 150 mcg by mouth Every morning on an empty stomach.  Dose:  150 mcg     omeprazole 20 MG delayed-release capsule  Commonly known as:  PRILOSEC   Take 20 mg by mouth every day.  Dose:  20 mg     primidone 50 MG Tabs  Commonly known as:  MYSOLINE   Take 50 mg by mouth 3 times a day.  Dose:  50 mg     triamcinolone acetonide 0.1 % Crea  Commonly known as:  KENALOG   Apply  to affected area(s) 1 time daily as needed.        STOP taking these medications    zolpidem 10 MG Tabs  Commonly known as:  AMBIEN            Allergies  Allergies   Allergen Reactions   • Codeine Itching   • Hydrocodone Itching     insomnia   • Oxycodone Itching     insomnia   • Cymbalta [Duloxetine Hcl]      irritable   • Statins [Hmg-Coa-R Inhibitors]      Dizzy   • Tramadol Itching     insomnia       DIET  Orders Placed This Encounter   Procedures   • Diet Order Regular     Standing Status:   Standing     Number of Occurrences:   1     Order Specific Question:   Diet:     Answer:   Regular [1]       ACTIVITY  As tolerated.    CONSULTATIONS  Speech  PT  OT    PROCEDURES  MR-BRAIN-W/O   Final Result      MRI of the brain without contrast within normal limits for age with mild to moderate atrophy and moderate white matter changes.      DX-LUMBAR SPINE-2 OR 3 VIEWS   Final Result      1.  Moderate degenerative change of lumbar spine.   2.  No  fracture or subluxation.      CT-CTA NECK WITH & W/O-POST PROCESSING   Final Result      1.  Mild stenosis of the carotid bulbs bilaterally, less than 50% luminal narrowing.   2.  No focal high-grade stenosis, dissection or occlusion of the cervical carotid or vertebral arteries.      CT-CTA HEAD WITH & W/O-POST PROCESS   Final Result      1.  No intracranial aneurysm, focal stenosis, or abrupt large vessel cut off.   2.  No acute intracranial abnormality.      DX-CHEST-PORTABLE (1 VIEW)   Final Result      1.  No acute cardiopulmonary disease.   2.  Stable cardiomegaly.      OUTSIDE IMAGES-CT HEAD   Final Result      OUTSIDE IMAGES-DX CHEST   Final Result            LABORATORY  Lab Results   Component Value Date    SODIUM 139 09/16/2018    POTASSIUM 3.8 09/16/2018    CHLORIDE 108 09/16/2018    CO2 23 09/16/2018    GLUCOSE 95 09/16/2018    BUN 19 09/16/2018    CREATININE 1.12 09/16/2018        Lab Results   Component Value Date    WBC 4.9 09/16/2018    HEMOGLOBIN 15.2 09/16/2018    HEMATOCRIT 43.8 09/16/2018    PLATELETCT 133 (L) 09/16/2018        Total time of the discharge process exceeds 45 minutes.

## 2018-09-16 NOTE — ED NOTES
PT assisted to the restroom via wheelchair. He did have moderate dizziness upon sitting and standing. Was able to transfer to and from wheelchair with minimal assistance.

## 2018-09-16 NOTE — ASSESSMENT & PLAN NOTE
Telermetry, neuro-checks, MRI brain, ASA, statin and ordered lipid panel, PT/OT  Ordered B12 and TSH  CTA pending  EKG pending; sinus on monitor currently

## 2018-12-15 LAB — EKG IMPRESSION: NORMAL
